# Patient Record
Sex: MALE | Race: WHITE | Employment: UNEMPLOYED | URBAN - METROPOLITAN AREA
[De-identification: names, ages, dates, MRNs, and addresses within clinical notes are randomized per-mention and may not be internally consistent; named-entity substitution may affect disease eponyms.]

---

## 2018-05-31 ENCOUNTER — HOSPITAL ENCOUNTER (EMERGENCY)
Age: 58
Discharge: HOME | End: 2018-05-31
Attending: EMERGENCY MEDICINE
Payer: COMMERCIAL

## 2018-05-31 VITALS
WEIGHT: 160 LBS | RESPIRATION RATE: 17 BRPM | OXYGEN SATURATION: 99 % | SYSTOLIC BLOOD PRESSURE: 167 MMHG | TEMPERATURE: 97.9 F | HEART RATE: 67 BPM | DIASTOLIC BLOOD PRESSURE: 97 MMHG | BODY MASS INDEX: 21.67 KG/M2 | HEIGHT: 72 IN

## 2018-05-31 DIAGNOSIS — M54.42 CHRONIC LEFT-SIDED LOW BACK PAIN WITH LEFT-SIDED SCIATICA: Primary | ICD-10-CM

## 2018-05-31 DIAGNOSIS — G89.29 CHRONIC LEFT-SIDED LOW BACK PAIN WITH LEFT-SIDED SCIATICA: Primary | ICD-10-CM

## 2018-05-31 LAB
ATRIAL RATE: 227 BPM
CALCULATED R AXIS, ECG10: 52 DEGREES
CALCULATED T AXIS, ECG11: -42 DEGREES
DIAGNOSIS, 93000: NORMAL
Q-T INTERVAL, ECG07: 392 MS
QRS DURATION, ECG06: 94 MS
QTC CALCULATION (BEZET), ECG08: 407 MS
VENTRICULAR RATE, ECG03: 65 BPM

## 2018-05-31 PROCEDURE — 99284 EMERGENCY DEPT VISIT MOD MDM: CPT | Performed by: EMERGENCY MEDICINE

## 2018-05-31 PROCEDURE — 93005 ELECTROCARDIOGRAM TRACING: CPT | Performed by: EMERGENCY MEDICINE

## 2018-05-31 RX ORDER — CYCLOBENZAPRINE HCL 5 MG
10 TABLET ORAL
Qty: 20 TAB | Refills: 0 | Status: SHIPPED | OUTPATIENT
Start: 2018-05-31 | End: 2018-06-08

## 2018-05-31 RX ORDER — HYDROCODONE BITARTRATE AND ACETAMINOPHEN 7.5; 325 MG/1; MG/1
1 TABLET ORAL
Qty: 15 TAB | Refills: 0 | Status: SHIPPED | OUTPATIENT
Start: 2018-05-31 | End: 2018-06-08

## 2018-05-31 NOTE — ED PROVIDER NOTES
HPI Comments: Patient with chronic back pain. Was seen by Dr. Bina Walton many years ago but was discharged from treatment years ago. Has continued to have back pain slightly worse over the past couple days. No saddle anesthesia change in bowel or bladder function. No reinjury. It is trying to get back into pain management. Patient is a 61 y.o. male presenting with back pain. The history is provided by the patient. No  was used. Back Pain    This is a chronic problem. The current episode started more than 2 days ago. The problem has been gradually worsening. The problem occurs constantly. Patient reports not work related injury. The pain is associated with a remote injury. The pain is present in the lumbar spine and right side. The quality of the pain is described as aching. The pain radiates to the right thigh. The pain is moderate. The symptoms are aggravated by bending and twisting. Pertinent negatives include no chest pain, no fever, no numbness, no headaches, no abdominal pain, no abdominal swelling, no bowel incontinence, no perianal numbness, no bladder incontinence, no dysuria, no paresthesias and no weakness. Past Medical History:   Diagnosis Date    Other ill-defined conditions(669.87)     back problems       Past Surgical History:   Procedure Laterality Date    HX APPENDECTOMY           No family history on file. Social History     Social History    Marital status:      Spouse name: N/A    Number of children: N/A    Years of education: N/A     Occupational History    Not on file. Social History Main Topics    Smoking status: Current Every Day Smoker     Packs/day: 0.50    Smokeless tobacco: Not on file    Alcohol use No    Drug use: No    Sexual activity: Not Currently     Other Topics Concern    Not on file     Social History Narrative         ALLERGIES: Review of patient's allergies indicates no known allergies.     Review of Systems Constitutional: Negative for chills and fever. Eyes: Negative for pain and redness. Respiratory: Negative for chest tightness, shortness of breath and wheezing. Cardiovascular: Negative for chest pain and leg swelling. Gastrointestinal: Negative for abdominal pain, bowel incontinence, diarrhea, nausea and vomiting. Genitourinary: Negative for bladder incontinence, dysuria and hematuria. Musculoskeletal: Positive for back pain. Negative for gait problem, neck pain and neck stiffness. Skin: Negative for color change and rash. Neurological: Negative for weakness, numbness, headaches and paresthesias. Vitals:    05/31/18 0919   BP: (!) 181/114   Pulse: 92   Resp: 18   Temp: 97.9 °F (36.6 °C)   SpO2: 98%   Weight: 72.6 kg (160 lb)   Height: 6' (1.829 m)            Physical Exam   Constitutional: He is oriented to person, place, and time. He appears well-developed and well-nourished. HENT:   Head: Normocephalic and atraumatic. Neck: Normal range of motion. Neck supple. Cardiovascular: Normal rate and regular rhythm. No murmur heard. Pulmonary/Chest: Effort normal and breath sounds normal. He has no wheezes. Abdominal: Soft. Bowel sounds are normal. There is no tenderness. Musculoskeletal: Normal range of motion. He exhibits tenderness (left lower back. no midline TTP. ). He exhibits no edema. Neurological: He is alert and oriented to person, place, and time. He exhibits normal muscle tone. Skin: Skin is warm and dry. Nursing note and vitals reviewed. MDM  Number of Diagnoses or Management Options  Diagnosis management comments: Chronic back pain will treat at home and referred to primary care doctor with them referring to pain management.     Patient Progress  Patient progress: stable        ED Course       Procedures

## 2018-05-31 NOTE — ED NOTES
Patient reports that he was trying to get into pain management and that his BP was \"too high to receive treatment. \" States that he is getting \"loratab off the street. \" States that he has tried everything but \"nothing helps. \" Reports that he has not tried physical therapy or chiropractor.

## 2018-05-31 NOTE — ED NOTES
I have reviewed discharge instructions with the patient. The patient verbalized understanding. Patient left ED via Discharge Method: ambulatory to Home with self. Opportunity for questions and clarification provided. Patient given 2 scripts. No e-sign. To continue your aftercare when you leave the hospital, you may receive an automated call from our care team to check in on how you are doing. This is a free service and part of our promise to provide the best care and service to meet your aftercare needs.  If you have questions, or wish to unsubscribe from this service please call [phone]. Thank you for Choosing our [facility] Emergency Department.

## 2018-05-31 NOTE — DISCHARGE INSTRUCTIONS
Back Pain: Care Instructions  Your Care Instructions    Back pain has many possible causes. It is often related to problems with muscles and ligaments of the back. It may also be related to problems with the nerves, discs, or bones of the back. Moving, lifting, standing, sitting, or sleeping in an awkward way can strain the back. Sometimes you don't notice the injury until later. Arthritis is another common cause of back pain. Although it may hurt a lot, back pain usually improves on its own within several weeks. Most people recover in 12 weeks or less. Using good home treatment and being careful not to stress your back can help you feel better sooner. Follow-up care is a key part of your treatment and safety. Be sure to make and go to all appointments, and call your doctor if you are having problems. It's also a good idea to know your test results and keep a list of the medicines you take. How can you care for yourself at home? · Sit or lie in positions that are most comfortable and reduce your pain. Try one of these positions when you lie down:  ¨ Lie on your back with your knees bent and supported by large pillows. ¨ Lie on the floor with your legs on the seat of a sofa or chair. Merlin Antu on your side with your knees and hips bent and a pillow between your legs. ¨ Lie on your stomach if it does not make pain worse. · Do not sit up in bed, and avoid soft couches and twisted positions. Bed rest can help relieve pain at first, but it delays healing. Avoid bed rest after the first day of back pain. · Change positions every 30 minutes. If you must sit for long periods of time, take breaks from sitting. Get up and walk around, or lie in a comfortable position. · Try using a heating pad on a low or medium setting for 15 to 20 minutes every 2 or 3 hours. Try a warm shower in place of one session with the heating pad. · You can also try an ice pack for 10 to 15 minutes every 2 to 3 hours.  Put a thin cloth between the ice pack and your skin. · Take pain medicines exactly as directed. ¨ If the doctor gave you a prescription medicine for pain, take it as prescribed. ¨ If you are not taking a prescription pain medicine, ask your doctor if you can take an over-the-counter medicine. · Take short walks several times a day. You can start with 5 to 10 minutes, 3 or 4 times a day, and work up to longer walks. Walk on level surfaces and avoid hills and stairs until your back is better. · Return to work and other activities as soon as you can. Continued rest without activity is usually not good for your back. · To prevent future back pain, do exercises to stretch and strengthen your back and stomach. Learn how to use good posture, safe lifting techniques, and proper body mechanics. When should you call for help? Call your doctor now or seek immediate medical care if:  ? · You have new or worsening numbness in your legs. ? · You have new or worsening weakness in your legs. (This could make it hard to stand up.)   ? · You lose control of your bladder or bowels. ? Watch closely for changes in your health, and be sure to contact your doctor if:  ? · Your pain gets worse. ? · You are not getting better after 2 weeks. Where can you learn more? Go to http://leila-mita.info/. Enter W607 in the search box to learn more about \"Back Pain: Care Instructions. \"  Current as of: March 21, 2017  Content Version: 11.4  © 5159-2269 [employer]. Care instructions adapted under license by [employer] (which disclaims liability or warranty for this information). If you have questions about a medical condition or this instruction, always ask your healthcare professional. [place] [address] any warranty or liability for your use of this information. Learning About Relief for Back Pain  What is back tension and strain?     Back strain happens when you overstretch, or pull, a muscle in your back. You may hurt your back in an accident or when you exercise or lift something. Most back pain will get better with rest and time. You can take care of yourself at home to help your back heal.  What can you do first to relieve back pain? When you first feel back pain, try these steps:  · Walk. Take a short walk (10 to 20 minutes) on a level surface (no slopes, hills, or stairs) every 2 to 3 hours. Walk only distances you can manage without pain, especially leg pain. · Relax. Find a comfortable position for rest. Some people are comfortable on the floor or a medium-firm bed with a small pillow under their head and another under their knees. Some people prefer to lie on their side with a pillow between their knees. Don't stay in one position for too long. · Try heat or ice. Try using a heating pad on a low or medium setting, or take a warm shower, for 15 to 20 minutes every 2 to 3 hours. Or you can buy single-use heat wraps that last up to 8 hours. You can also try an ice pack for 10 to 15 minutes every 2 to 3 hours. You can use an ice pack or a bag of frozen vegetables wrapped in a thin towel. There is not strong evidence that either heat or ice will help, but you can try them to see if they help. You may also want to try switching between heat and cold. · Take pain medicine exactly as directed. ¨ If the doctor gave you a prescription medicine for pain, take it as prescribed. ¨ If you are not taking a prescription pain medicine, ask your doctor if you can take an over-the-counter medicine. What else can you do? · Stretch and exercise. Exercises that increase flexibility may relieve your pain and make it easier for your muscles to keep your spine in a good, neutral position. And don't forget to keep walking. · Do self-massage. You can use self-massage to unwind after work or school or to energize yourself in the morning. You can easily massage your feet, hands, or neck. Self-massage works best if you are in comfortable clothes and are sitting or lying in a comfortable position. Use oil or lotion to massage bare skin. · Reduce stress. Back pain can lead to a vicious [place]: Distress about the pain tenses the muscles in your back, which in turn causes more pain. Learn how to relax your mind and your muscles to lower your stress. Where can you learn more? Go to http://leila-mita.info/. Enter Y414 in the search box to learn more about \"Learning About Relief for Back Pain. \"  Current as of: March 21, 2017  Content Version: 11.4  © 2528-9602 [employer]. Care instructions adapted under license by [employer] (which disclaims liability or warranty for this information). If you have questions about a medical condition or this instruction, always ask your healthcare professional. [address] any warranty or liability for your use of this information.

## 2018-06-08 ENCOUNTER — HOSPITAL ENCOUNTER (EMERGENCY)
Age: 58
Discharge: HOME | End: 2018-06-08
Attending: EMERGENCY MEDICINE
Payer: COMMERCIAL

## 2018-06-08 VITALS
TEMPERATURE: 98.7 F | RESPIRATION RATE: 18 BRPM | HEART RATE: 85 BPM | SYSTOLIC BLOOD PRESSURE: 166 MMHG | DIASTOLIC BLOOD PRESSURE: 101 MMHG | BODY MASS INDEX: 21.4 KG/M2 | HEIGHT: 72 IN | OXYGEN SATURATION: 98 % | WEIGHT: 158 LBS

## 2018-06-08 DIAGNOSIS — M54.32 SCIATICA OF LEFT SIDE: Primary | ICD-10-CM

## 2018-06-08 PROCEDURE — 99282 EMERGENCY DEPT VISIT SF MDM: CPT | Performed by: PHYSICIAN ASSISTANT

## 2018-06-08 RX ORDER — TRAMADOL HYDROCHLORIDE 50 MG/1
50 TABLET ORAL
Qty: 11 TAB | Refills: 0 | Status: SHIPPED | OUTPATIENT
Start: 2018-06-08 | End: 2018-10-08

## 2018-06-08 RX ORDER — METHOCARBAMOL 750 MG/1
750 TABLET, FILM COATED ORAL 4 TIMES DAILY
Qty: 30 TAB | Refills: 0 | Status: SHIPPED | OUTPATIENT
Start: 2018-06-08 | End: 2018-06-16

## 2018-06-08 NOTE — DISCHARGE INSTRUCTIONS

## 2018-06-08 NOTE — ED PROVIDER NOTES
HPI Comments: Pt returns to er for pan meds, states will have appt hopefully in 1 week at [facility],     Patient is a 61 y.o. male presenting with back pain. The history is provided by the patient. Back Pain    This is a chronic problem. Episode onset: years  The problem occurs constantly. Patient reports not work related injury. The pain is associated with no known injury. The pain is present in the lumbar spine. The quality of the pain is described as aching and similar to previous episodes. The pain radiates to the left thigh. The pain is at a severity of 7/10. The pain is mild. The symptoms are aggravated by twisting. Associated symptoms include leg pain. He has tried analgesics for the symptoms. The treatment provided moderate relief. Risk factors include a sedentary lifestyle. Past Medical History:   Diagnosis Date    Other ill-defined conditions(069.89)     back problems       Past Surgical History:   Procedure Laterality Date    HX APPENDECTOMY           History reviewed. No pertinent family history. Social History     Social History    Marital status:      Spouse name: N/A    Number of children: N/A    Years of education: N/A     Occupational History    Not on file. Social History Main Topics    Smoking status: Current Every Day Smoker     Packs/day: 0.50    Smokeless tobacco: Not on file    Alcohol use No    Drug use: No    Sexual activity: Not Currently     Other Topics Concern    Not on file     Social History Narrative         ALLERGIES: Review of patient's allergies indicates no known allergies. Review of Systems   Musculoskeletal: Positive for back pain. All other systems reviewed and are negative. Vitals:    06/08/18 1302   BP: (!) 166/101   Pulse: 85   Resp: 18   Temp: 98.7 °F (37.1 °C)   SpO2: 98%   Weight: 71.7 kg (158 lb)   Height: 6' (1.829 m)            Physical Exam   Constitutional: He is oriented to person, place, and time.  He appears well-developed and well-nourished. No distress. HENT:   Head: Normocephalic and atraumatic. Eyes: Conjunctivae and EOM are normal. Pupils are equal, round, and reactive to light. Neck: Normal range of motion. Neck supple. Cardiovascular: Normal rate and regular rhythm. Pulmonary/Chest: Effort normal and breath sounds normal. No respiratory distress. He has no wheezes. Abdominal: Soft. Bowel sounds are normal.   Musculoskeletal: He exhibits tenderness. He exhibits no edema. Pain to left lower back into left hip and thigh no skin chages, no numbness to foot    Neurological: He is alert and oriented to person, place, and time. Skin: Skin is warm. Nursing note and vitals reviewed.        MDM  Number of Diagnoses or Management Options  Diagnosis management comments: Sciatica will give ultram and robaxin        Amount and/or Complexity of Data Reviewed  Review and summarize past medical records: yes    Risk of Complications, Morbidity, and/or Mortality  Presenting problems: low  Diagnostic procedures: low  Management options: low    Patient Progress  Patient progress: improved        ED Course       Procedures

## 2018-06-08 NOTE — ED NOTES
I have reviewed discharge instructions with the patient. The patient verbalized understanding. Patient left ED via Discharge Method: ambulatory to Home with self. Opportunity for questions and clarification provided. Patient given 2 scripts. To continue your aftercare when you leave the hospital, you may receive an automated call from our care team to check in on how you are doing. This is a free service and part of our promise to provide the best care and service to meet your aftercare needs.  If you have questions, or wish to unsubscribe from this service please call [phone]. Thank you for Choosing our [facility] Emergency Department.

## 2018-06-08 NOTE — ED TRIAGE NOTES
C/o lower back pain. Onset approx 6-7 years ago with worsening over last week. States pain radiates down left leg, states causes my \"leg to go out\". Admits to falling approx 1 week ago with possible worsening at that time. Scheduled for pain management approx next Friday. States out of pain meds x3 days.  Denies urinary symptoms

## 2018-07-23 ENCOUNTER — HOSPITAL ENCOUNTER (EMERGENCY)
Age: 58
Discharge: HOME | End: 2018-07-23
Attending: EMERGENCY MEDICINE
Payer: COMMERCIAL

## 2018-07-23 VITALS
HEART RATE: 68 BPM | HEIGHT: 72 IN | WEIGHT: 158 LBS | DIASTOLIC BLOOD PRESSURE: 97 MMHG | RESPIRATION RATE: 20 BRPM | OXYGEN SATURATION: 99 % | SYSTOLIC BLOOD PRESSURE: 186 MMHG | BODY MASS INDEX: 21.4 KG/M2 | TEMPERATURE: 98.3 F

## 2018-07-23 PROCEDURE — 75810000275 HC EMERGENCY DEPT VISIT NO LEVEL OF CARE: Performed by: EMERGENCY MEDICINE

## 2018-08-17 ENCOUNTER — HOSPITAL ENCOUNTER (EMERGENCY)
Age: 58
Discharge: HOME | End: 2018-08-17
Attending: EMERGENCY MEDICINE
Payer: COMMERCIAL

## 2018-08-17 VITALS
WEIGHT: 160 LBS | RESPIRATION RATE: 18 BRPM | TEMPERATURE: 98.2 F | BODY MASS INDEX: 21.67 KG/M2 | OXYGEN SATURATION: 98 % | HEIGHT: 72 IN | HEART RATE: 94 BPM | DIASTOLIC BLOOD PRESSURE: 80 MMHG | SYSTOLIC BLOOD PRESSURE: 171 MMHG

## 2018-08-17 DIAGNOSIS — M54.50 CHRONIC LOW BACK PAIN WITHOUT SCIATICA, UNSPECIFIED BACK PAIN LATERALITY: Primary | ICD-10-CM

## 2018-08-17 DIAGNOSIS — G89.29 CHRONIC LOW BACK PAIN WITHOUT SCIATICA, UNSPECIFIED BACK PAIN LATERALITY: Primary | ICD-10-CM

## 2018-08-17 PROCEDURE — 99282 EMERGENCY DEPT VISIT SF MDM: CPT | Performed by: NURSE PRACTITIONER

## 2018-08-17 PROCEDURE — 96372 THER/PROPH/DIAG INJ SC/IM: CPT | Performed by: NURSE PRACTITIONER

## 2018-08-17 PROCEDURE — 74011250636 HC RX REV CODE- 250/636: Performed by: NURSE PRACTITIONER

## 2018-08-17 RX ORDER — METAXALONE 800 MG/1
800 TABLET ORAL 4 TIMES DAILY
Qty: 20 TAB | Refills: 0 | Status: SHIPPED | OUTPATIENT
Start: 2018-08-17 | End: 2018-08-22

## 2018-08-17 RX ORDER — LIDOCAINE 4 G/100G
PATCH TOPICAL
Qty: 10 PATCH | Refills: 0 | Status: SHIPPED | OUTPATIENT
Start: 2018-08-17

## 2018-08-17 RX ORDER — DEXAMETHASONE SODIUM PHOSPHATE 100 MG/10ML
10 INJECTION INTRAMUSCULAR; INTRAVENOUS
Status: COMPLETED | OUTPATIENT
Start: 2018-08-17 | End: 2018-08-17

## 2018-08-17 RX ORDER — KETOROLAC TROMETHAMINE 30 MG/ML
30 INJECTION, SOLUTION INTRAMUSCULAR; INTRAVENOUS
Status: COMPLETED | OUTPATIENT
Start: 2018-08-17 | End: 2018-08-17

## 2018-08-17 RX ORDER — PREDNISONE 20 MG/1
TABLET ORAL
Qty: 11 TAB | Refills: 0 | Status: SHIPPED | OUTPATIENT
Start: 2018-08-17 | End: 2018-10-08

## 2018-08-17 RX ADMIN — KETOROLAC TROMETHAMINE 30 MG: 30 INJECTION, SOLUTION INTRAMUSCULAR at 12:53

## 2018-08-17 RX ADMIN — DEXAMETHASONE SODIUM PHOSPHATE 10 MG: 10 INJECTION INTRAMUSCULAR; INTRAVENOUS at 12:52

## 2018-08-17 NOTE — ED TRIAGE NOTES
Pt reports hx of herniated disk in back, appt with pain management this week but they didn't take his insurance. Pt states he needs something for the pain.

## 2018-08-17 NOTE — ED NOTES
I have reviewed discharge instructions with the patient. The patient verbalized understanding. Patient left ED via Discharge Method: ambulatory to Home with (SELF). Opportunity for questions and clarification provided. Patient given 3 scripts. To continue your aftercare when you leave the hospital, you may receive an automated call from our care team to check in on how you are doing. This is a free service and part of our promise to provide the best care and service to meet your aftercare needs.  If you have questions, or wish to unsubscribe from this service please call [phone]. Thank you for Choosing our [facility] Emergency Department.

## 2018-08-17 NOTE — ED PROVIDER NOTES
HPI Comments: 58-year-old male presents for evaluation of low back pain. He was scheduled through [facility] to see pain management this week. He waited for his appointment he went for his appointment and on arrival found out that no they did not take his insurance. He went back to [facility] to be seen but they could not see him. They will see him in 72 hours. He presents today for evaluation and treatment of his low back pain. He reports that he has chronic low back pain and he has old MRI results with him he has pain to the left lower back,, worse with bending movement walking. No difficulty with bowel or bladder. Appears quite uncomfortable. No fever chills nausea vomiting diarrhea cough or congestion. He is ambulatory, with a slow but steady gait    Patient is a 60 y.o. male presenting with back pain. The history is provided by the patient. No  was used. Back Pain    This is a recurrent problem. The problem has not changed since onset. The problem occurs constantly. The pain is associated with a remote injury. The pain is present in the lower back and left side. The quality of the pain is described as aching. The pain is moderate. Pertinent negatives include no chest pain, no fever, no numbness, no weight loss, no headaches, no abdominal pain, no abdominal swelling, no bowel incontinence, no perianal numbness, no bladder incontinence, no dysuria, no pelvic pain, no leg pain, no paresthesias, no paresis, no tingling and no weakness. Past Medical History:   Diagnosis Date    Other ill-defined conditions(369.89)     back problems       Past Surgical History:   Procedure Laterality Date    HX APPENDECTOMY           History reviewed. No pertinent family history. Social History     Social History    Marital status:      Spouse name: N/A    Number of children: N/A    Years of education: N/A     Occupational History    Not on file.      Social History Main Topics  Smoking status: Current Every Day Smoker     Packs/day: 0.50    Smokeless tobacco: Not on file    Alcohol use No    Drug use: No    Sexual activity: Not Currently     Other Topics Concern    Not on file     Social History Narrative         ALLERGIES: Review of patient's allergies indicates no known allergies. Review of Systems   Constitutional: Negative for chills, fever and weight loss. HENT: Negative for facial swelling and mouth sores. Eyes: Negative for discharge and redness. Respiratory: Negative for cough and shortness of breath. Cardiovascular: Negative for chest pain and palpitations. Gastrointestinal: Negative for abdominal pain, bowel incontinence, nausea and vomiting. Genitourinary: Negative for bladder incontinence, difficulty urinating, dysuria and pelvic pain. Musculoskeletal: Positive for back pain and myalgias. Skin: Negative for pallor and rash. Neurological: Negative for tingling, weakness, numbness, headaches and paresthesias. Psychiatric/Behavioral: Negative for confusion and decreased concentration. Vitals:    08/17/18 1127   BP: 171/80   Pulse: 94   Resp: 18   Temp: 98.2 °F (36.8 °C)   SpO2: 98%   Weight: 72.6 kg (160 lb)   Height: 6' (1.829 m)            Physical Exam   Constitutional: He is oriented to person, place, and time. He appears well-developed and well-nourished. HENT:   Head: Normocephalic and atraumatic. Eyes: EOM are normal. Pupils are equal, round, and reactive to light. Neck: Normal range of motion. Cardiovascular: Normal rate and regular rhythm. Pulmonary/Chest: Effort normal and breath sounds normal.   Abdominal: Soft. Musculoskeletal: Normal range of motion. He exhibits tenderness. Lumbar back: He exhibits tenderness. Back:    Neurological: He is alert and oriented to person, place, and time. Skin: Skin is warm and dry. Psychiatric: He has a normal mood and affect.  His behavior is normal. Thought content normal.   Nursing note and vitals reviewed. MDM  Number of Diagnoses or Management Options  Diagnosis management comments: 18-year-old male presents for evaluation of low back pain. He was scheduled through [facility] to see pain management this week. He waited for his appointment he went for his appointment and on arrival found out that no they did not take his insurance. He went back to [facility] to be seen but they could not see him. They will see him in 72 hours. He presents today for evaluation and treatment of his low back pain. He reports that he has chronic low back pain and he has old MRI results with him he has pain to the left lower back,, worse with bending movement walking. No difficulty with bowel or bladder. Appears quite uncomfortable. No fever chills nausea vomiting diarrhea cough or congestion. He is ambulatory, with a slow but steady gait    On exam pt with pain as noted.   Will provide pain control in ed, dc home with steroids and non narcotic pain control   Copy of mri to chart    Risk of Complications, Morbidity, and/or Mortality  Presenting problems: minimal  Diagnostic procedures: minimal  Management options: low    Patient Progress  Patient progress: stable        ED Course       Procedures

## 2018-08-17 NOTE — DISCHARGE INSTRUCTIONS
Learning About How to Have a Healthy Back  What causes back pain? Back pain is often caused by overuse, strain, or injury. For example, people often hurt their backs playing sports or working in the yard, being jolted in a car accident, or lifting something too heavy. Aging plays a part too. Your bones and muscles tend to lose strength as you age, which makes injury more likely. The spongy discs between the bones of the spine (vertebrae) may suffer from wear and tear and no longer provide enough cushion between the bones. A disc that bulges or breaks open (herniated disc) can press on nerves, causing back pain. In some people, back pain is the result of arthritis, broken vertebrae caused by bone loss (osteoporosis), illness, or a spine problem. Although most people have back pain at one time or another, there are steps you can take to make it less likely. How can you have a healthy back? Reduce stress on your back through good posture  Slumping or slouching alone may not cause low back pain. But after the back has been strained or injured, bad posture can make pain worse. · Sleep in a position that maintains your back's normal curves and on a mattress that feels comfortable. Sleep on your side with a pillow between your knees, or sleep on your back with a pillow under your knees. These positions can reduce strain on your back. · Stand and sit up straight. \"Good posture\" generally means your ears, shoulders, and hips are in a straight line. · If you must stand for a long time, put one foot on a stool, ledge, or box. Switch feet every now and then. · Sit in a chair that is low enough to let you place both feet flat on the floor with both knees nearly level with your hips. If your chair or desk is too high, use a footrest to raise your knees. Place a small pillow, a rolled-up towel, or a lumbar roll in the curve of your back if you need extra support.   · Try a kneeling chair, which helps tilt your hips forward. This takes pressure off your lower back. · Try sitting on an exercise ball. It can rock from side to side, which helps keep your back loose. · When driving, keep your knees nearly level with your hips. Sit straight, and drive with both hands on the steering wheel. Your arms should be in a slightly bent position. Reduce stress on your back through careful lifting  · Squat down, bending at the hips and knees only. If you need to, put one knee to the floor and extend your other knee in front of you, bent at a right angle (half kneeling). · Press your chest straight forward. This helps keep your upper back straight while keeping a slight arch in your low back. · Hold the load as close to your body as possible, at the level of your belly button (navel). · Use your feet to change direction, taking small steps. · Lead with your hips as you change direction. Keep your shoulders in line with your hips as you move. · Set down your load carefully, squatting with your knees and hips only. Exercise and stretch your back  · Do some exercise on most days of the week, if your doctor says it is okay. You can walk, run, swim, or cycle. · Stretch your back muscles. Here are a few exercises to try:  Josephus Phlegm on your back, and gently pull one bent knee to your chest. Put that foot back on the floor, and then pull the other knee to your chest.  ¨ Do pelvic tilts. Lie on your back with your knees bent. Tighten your stomach muscles. Pull your belly button (navel) in and up toward your ribs. You should feel like your back is pressing to the floor and your hips and pelvis are slightly lifting off the floor. Hold for 6 seconds while breathing smoothly. ¨ Sit with your back flat against a wall. · Keep your core muscles strong. The muscles of your back, belly (abdomen), and buttocks support your spine. ¨ Pull in your belly and imagine pulling your navel toward your spine. Hold this for 6 seconds, then relax.  Remember to keep breathing normally as you tense your muscles. ¨ Do curl-ups. Always do them with your knees bent. Keep your low back on the floor, and curl your shoulders toward your knees using a smooth, slow motion. Keep your arms folded across your chest. If this bothers your neck, try putting your hands behind your neck (not your head), with your elbows spread apart. ¨ Lie on your back with your knees bent and your feet flat on the floor. Tighten your belly muscles, and then push with your feet and raise your buttocks up a few inches. Hold this position 6 seconds as you continue to breathe normally, then lower yourself slowly to the floor. Repeat 8 to 12 times. ¨ If you like group exercise, try Pilates or yoga. These classes have poses that strengthen the core muscles. Lead a healthy lifestyle  · Stay at a healthy weight to avoid strain on your back. · Do not smoke. Smoking increases the risk of osteoporosis, which weakens the spine. If you need help quitting, talk to your doctor about stop-smoking programs and medicines. These can increase your chances of quitting for good. Where can you learn more? Go to http://leila-mita.info/. Enter L315 in the search box to learn more about \"Learning About How to Have a Healthy Back. \"  Current as of: November 29, 2017  Content Version: 11.7  © 3244-0888 [employer]. Care instructions adapted under license by [employer] (which disclaims liability or warranty for this information). If you have questions about a medical condition or this instruction, always ask your healthcare professional. [place] [address] any warranty or liability for your use of this information.

## 2019-03-16 ENCOUNTER — APPOINTMENT (OUTPATIENT)
Dept: CT IMAGING | Age: 59
DRG: 070 | End: 2019-03-16
Attending: EMERGENCY MEDICINE
Payer: COMMERCIAL

## 2019-03-16 ENCOUNTER — HOSPITAL ENCOUNTER (INPATIENT)
Age: 59
LOS: 3 days | Discharge: HOME | DRG: 070 | End: 2019-03-20
Attending: EMERGENCY MEDICINE | Admitting: HOSPITALIST
Payer: COMMERCIAL

## 2019-03-16 DIAGNOSIS — I10 HYPERTENSION, UNSPECIFIED TYPE: ICD-10-CM

## 2019-03-16 DIAGNOSIS — G93.40 ENCEPHALOPATHY ACUTE: Primary | ICD-10-CM

## 2019-03-16 LAB
ALBUMIN SERPL-MCNC: 3.5 G/DL (ref 3.5–5)
ALBUMIN/GLOB SERPL: 0.9 {RATIO} (ref 1.2–3.5)
ALP SERPL-CCNC: 75 U/L (ref 50–136)
ALT SERPL-CCNC: 139 U/L (ref 12–65)
AMPHET UR QL SCN: NEGATIVE
ANION GAP SERPL CALC-SCNC: 6 MMOL/L (ref 7–16)
APAP SERPL-MCNC: <2 UG/ML (ref 10–30)
AST SERPL-CCNC: 107 U/L (ref 15–37)
BARBITURATES UR QL SCN: NEGATIVE
BASOPHILS # BLD: 0 K/UL (ref 0–0.2)
BASOPHILS NFR BLD: 0 % (ref 0–2)
BENZODIAZ UR QL: NEGATIVE
BILIRUB SERPL-MCNC: 0.2 MG/DL (ref 0.2–1.1)
BUN SERPL-MCNC: 22 MG/DL (ref 6–23)
CALCIUM SERPL-MCNC: 8.2 MG/DL (ref 8.3–10.4)
CANNABINOIDS UR QL SCN: NEGATIVE
CHLORIDE SERPL-SCNC: 106 MMOL/L (ref 98–107)
CK SERPL-CCNC: 247 U/L (ref 21–215)
CO2 SERPL-SCNC: 31 MMOL/L (ref 21–32)
COCAINE UR QL SCN: NEGATIVE
CREAT SERPL-MCNC: 0.97 MG/DL (ref 0.8–1.5)
DIFFERENTIAL METHOD BLD: ABNORMAL
EOSINOPHIL # BLD: 0.2 K/UL (ref 0–0.8)
EOSINOPHIL NFR BLD: 2 % (ref 0.5–7.8)
ERYTHROCYTE [DISTWIDTH] IN BLOOD BY AUTOMATED COUNT: 13.7 % (ref 11.9–14.6)
ETHANOL SERPL-MCNC: <3 MG/DL
GLOBULIN SER CALC-MCNC: 4.1 G/DL (ref 2.3–3.5)
GLUCOSE BLD STRIP.AUTO-MCNC: 140 MG/DL (ref 65–100)
GLUCOSE SERPL-MCNC: 60 MG/DL (ref 65–100)
HCT VFR BLD AUTO: 40.5 % (ref 41.1–50.3)
HGB BLD-MCNC: 13.1 G/DL (ref 13.6–17.2)
IMM GRANULOCYTES # BLD AUTO: 0.1 K/UL (ref 0–0.5)
IMM GRANULOCYTES NFR BLD AUTO: 0 % (ref 0–5)
LYMPHOCYTES # BLD: 3.9 K/UL (ref 0.5–4.6)
LYMPHOCYTES NFR BLD: 29 % (ref 13–44)
MCH RBC QN AUTO: 29.6 PG (ref 26.1–32.9)
MCHC RBC AUTO-ENTMCNC: 32.3 G/DL (ref 31.4–35)
MCV RBC AUTO: 91.4 FL (ref 79.6–97.8)
METHADONE UR QL: POSITIVE
MONOCYTES # BLD: 1.3 K/UL (ref 0.1–1.3)
MONOCYTES NFR BLD: 10 % (ref 4–12)
NEUTS SEG # BLD: 7.7 K/UL (ref 1.7–8.2)
NEUTS SEG NFR BLD: 59 % (ref 43–78)
NRBC # BLD: 0 K/UL (ref 0–0.2)
OPIATES UR QL: POSITIVE
PCP UR QL: NEGATIVE
PLATELET # BLD AUTO: 282 K/UL (ref 150–450)
PMV BLD AUTO: 9 FL (ref 9.4–12.3)
POTASSIUM SERPL-SCNC: 3.2 MMOL/L (ref 3.5–5.1)
PROT SERPL-MCNC: 7.6 G/DL (ref 6.3–8.2)
RBC # BLD AUTO: 4.43 M/UL (ref 4.23–5.6)
SALICYLATES SERPL-MCNC: 17.3 MG/DL (ref 2.8–20)
SODIUM SERPL-SCNC: 143 MMOL/L (ref 136–145)
WBC # BLD AUTO: 13.2 K/UL (ref 4.3–11.1)

## 2019-03-16 PROCEDURE — 74011250636 HC RX REV CODE- 250/636: Performed by: EMERGENCY MEDICINE

## 2019-03-16 PROCEDURE — 96372 THER/PROPH/DIAG INJ SC/IM: CPT | Performed by: EMERGENCY MEDICINE

## 2019-03-16 PROCEDURE — 96361 HYDRATE IV INFUSION ADD-ON: CPT | Performed by: EMERGENCY MEDICINE

## 2019-03-16 PROCEDURE — 96374 THER/PROPH/DIAG INJ IV PUSH: CPT | Performed by: EMERGENCY MEDICINE

## 2019-03-16 PROCEDURE — 70450 CT HEAD/BRAIN W/O DYE: CPT

## 2019-03-16 PROCEDURE — 85025 COMPLETE CBC W/AUTO DIFF WBC: CPT

## 2019-03-16 PROCEDURE — 80307 DRUG TEST PRSMV CHEM ANLYZR: CPT

## 2019-03-16 PROCEDURE — 82550 ASSAY OF CK (CPK): CPT

## 2019-03-16 PROCEDURE — 93005 ELECTROCARDIOGRAM TRACING: CPT | Performed by: EMERGENCY MEDICINE

## 2019-03-16 PROCEDURE — 96376 TX/PRO/DX INJ SAME DRUG ADON: CPT | Performed by: EMERGENCY MEDICINE

## 2019-03-16 PROCEDURE — 80053 COMPREHEN METABOLIC PANEL: CPT

## 2019-03-16 PROCEDURE — 74011000250 HC RX REV CODE- 250: Performed by: EMERGENCY MEDICINE

## 2019-03-16 PROCEDURE — 82962 GLUCOSE BLOOD TEST: CPT

## 2019-03-16 PROCEDURE — 96375 TX/PRO/DX INJ NEW DRUG ADDON: CPT | Performed by: EMERGENCY MEDICINE

## 2019-03-16 PROCEDURE — 74011250636 HC RX REV CODE- 250/636

## 2019-03-16 PROCEDURE — 99285 EMERGENCY DEPT VISIT HI MDM: CPT | Performed by: EMERGENCY MEDICINE

## 2019-03-16 RX ORDER — SODIUM CHLORIDE 9 MG/ML
150 INJECTION, SOLUTION INTRAVENOUS CONTINUOUS
Status: DISCONTINUED | OUTPATIENT
Start: 2019-03-16 | End: 2019-03-17

## 2019-03-16 RX ORDER — KETAMINE HYDROCHLORIDE 50 MG/ML
1 INJECTION, SOLUTION INTRAMUSCULAR; INTRAVENOUS ONCE
Status: COMPLETED | OUTPATIENT
Start: 2019-03-16 | End: 2019-03-16

## 2019-03-16 RX ORDER — LORAZEPAM 2 MG/ML
2 INJECTION INTRAMUSCULAR
Status: COMPLETED | OUTPATIENT
Start: 2019-03-16 | End: 2019-03-16

## 2019-03-16 RX ORDER — ONDANSETRON 2 MG/ML
4 INJECTION INTRAMUSCULAR; INTRAVENOUS
Status: COMPLETED | OUTPATIENT
Start: 2019-03-16 | End: 2019-03-16

## 2019-03-16 RX ORDER — DEXTROSE 50 % IN WATER (D50W) INTRAVENOUS SYRINGE
50
Status: COMPLETED | OUTPATIENT
Start: 2019-03-16 | End: 2019-03-16

## 2019-03-16 RX ORDER — LORAZEPAM 2 MG/ML
1 INJECTION INTRAMUSCULAR
Status: DISCONTINUED | OUTPATIENT
Start: 2019-03-16 | End: 2019-03-16

## 2019-03-16 RX ORDER — LORAZEPAM 2 MG/ML
INJECTION INTRAMUSCULAR
Status: COMPLETED
Start: 2019-03-16 | End: 2019-03-16

## 2019-03-16 RX ORDER — MORPHINE SULFATE 4 MG/ML
8 INJECTION INTRAVENOUS
Status: COMPLETED | OUTPATIENT
Start: 2019-03-16 | End: 2019-03-16

## 2019-03-16 RX ORDER — KETAMINE HYDROCHLORIDE 50 MG/ML
0.5 INJECTION, SOLUTION INTRAMUSCULAR; INTRAVENOUS ONCE
Status: DISCONTINUED | OUTPATIENT
Start: 2019-03-16 | End: 2019-03-16

## 2019-03-16 RX ORDER — ZIPRASIDONE MESYLATE 20 MG/ML
INJECTION, POWDER, LYOPHILIZED, FOR SOLUTION INTRAMUSCULAR
Status: COMPLETED
Start: 2019-03-16 | End: 2019-03-16

## 2019-03-16 RX ADMIN — WATER 10 MG: 1 INJECTION INTRAMUSCULAR; INTRAVENOUS; SUBCUTANEOUS at 19:39

## 2019-03-16 RX ADMIN — LORAZEPAM 2 MG: 2 INJECTION INTRAMUSCULAR; INTRAVENOUS at 22:21

## 2019-03-16 RX ADMIN — DEXTROSE MONOHYDRATE 25 G: 25 INJECTION, SOLUTION INTRAVENOUS at 21:48

## 2019-03-16 RX ADMIN — ONDANSETRON 4 MG: 2 INJECTION INTRAMUSCULAR; INTRAVENOUS at 19:16

## 2019-03-16 RX ADMIN — KETAMINE HYDROCHLORIDE 73 MG: 50 INJECTION, SOLUTION INTRAMUSCULAR; INTRAVENOUS at 23:30

## 2019-03-16 RX ADMIN — LORAZEPAM 2 MG: 2 INJECTION INTRAMUSCULAR; INTRAVENOUS at 20:43

## 2019-03-16 RX ADMIN — LORAZEPAM 2 MG: 2 INJECTION INTRAMUSCULAR; INTRAVENOUS at 19:17

## 2019-03-16 RX ADMIN — MORPHINE SULFATE 8 MG: 4 INJECTION INTRAVENOUS at 23:05

## 2019-03-16 RX ADMIN — SODIUM CHLORIDE 150 ML/HR: 900 INJECTION, SOLUTION INTRAVENOUS at 20:42

## 2019-03-16 RX ADMIN — LORAZEPAM 2 MG: 2 INJECTION INTRAMUSCULAR at 20:12

## 2019-03-16 RX ADMIN — KETAMINE HYDROCHLORIDE 73 MG: 50 INJECTION, SOLUTION INTRAMUSCULAR; INTRAVENOUS at 21:45

## 2019-03-16 RX ADMIN — LORAZEPAM 2 MG: 2 INJECTION INTRAMUSCULAR; INTRAVENOUS at 23:28

## 2019-03-16 RX ADMIN — ZIPRASIDONE MESYLATE 10 MG: 20 INJECTION, POWDER, LYOPHILIZED, FOR SOLUTION INTRAMUSCULAR at 19:56

## 2019-03-16 RX ADMIN — LORAZEPAM 2 MG: 2 INJECTION INTRAMUSCULAR at 19:17

## 2019-03-16 RX ADMIN — LORAZEPAM 2 MG: 2 INJECTION, SOLUTION INTRAMUSCULAR; INTRAVENOUS at 20:12

## 2019-03-16 NOTE — ED TRIAGE NOTES
Pt arrives per EMS. Pt called EMS for complaints of having a seizure. Initially pt refused but then came out to the ambulance wanting to come. During transport pt became combative and EMS had torestrain. Pt given 250ml of D10. Pts initally bgl 56. Refused oral glucose. BGL then 161 and no change in pts aggressive behavior.

## 2019-03-16 NOTE — ED PROVIDER NOTES
The history is provided by the patient and the EMS personnel. Aggressive behavior    This is a new problem. The current episode started 1 to 2 hours ago. The problem has been gradually improving. Associated symptoms include agitation. Mental status baseline is normal.  Risk factors include illicit drug use (patient states he \"took a white pill. \"). His past medical history does not include head trauma or heart disease. Past Medical History:   Diagnosis Date    Other ill-defined conditions(799.89)     back problems       Past Surgical History:   Procedure Laterality Date    HX APPENDECTOMY           History reviewed. No pertinent family history. Social History     Socioeconomic History    Marital status:      Spouse name: Not on file    Number of children: Not on file    Years of education: Not on file    Highest education level: Not on file   Social Needs    Financial resource strain: Not on file    Food insecurity - worry: Not on file    Food insecurity - inability: Not on file    Transportation needs - medical: Not on file   [employer] needs - non-medical: Not on file   Occupational History    Not on file   Tobacco Use    Smoking status: Current Every Day Smoker     Packs/day: 0.50    Smokeless tobacco: Never Used   Substance and Sexual Activity    Alcohol use: No    Drug use: No    Sexual activity: Not Currently   Other Topics Concern    Not on file   Social History Narrative    Not on file         ALLERGIES: Patient has no known allergies. Review of Systems   Unable to perform ROS: Mental status change   Psychiatric/Behavioral: Positive for agitation. Vitals:    03/16/19 1903   BP: (!) 185/91   Pulse: 81   Resp: 18   SpO2: 98%   Weight: 73 kg (161 lb)   Height: 5' 9\" (1.753 m)            Physical Exam   Constitutional: He is oriented to person, place, and time. He appears well-developed and well-nourished. He appears distressed.    HENT:   Head: Normocephalic and atraumatic. Mouth/Throat: Oropharynx is clear and moist. No oropharyngeal exudate. Eyes: Conjunctivae and EOM are normal. Pupils are equal, round, and reactive to light. No scleral icterus. Neck: Normal range of motion. Neck supple. No JVD present. No thyromegaly present. Cardiovascular: Normal rate, regular rhythm, normal heart sounds and intact distal pulses. Exam reveals no gallop and no friction rub. No murmur heard. Pulmonary/Chest: Effort normal and breath sounds normal. No respiratory distress. He has no wheezes. Abdominal: Soft. Bowel sounds are normal. He exhibits no distension. There is no hepatosplenomegaly. There is no tenderness. Musculoskeletal: Normal range of motion. He exhibits no edema, tenderness or deformity. Neurological: He is alert and oriented to person, place, and time. No cranial nerve deficit or sensory deficit. He exhibits normal muscle tone. Coordination normal.   Skin: Skin is warm. Capillary refill takes less than 2 seconds. No rash noted. He is diaphoretic. Psychiatric: His affect is labile and inappropriate. His speech is delayed. He is agitated and hyperactive. Cognition and memory are impaired. Nursing note and vitals reviewed. MDM  Number of Diagnoses or Management Options  Encephalopathy acute: new and requires workup  Hypertension, unspecified type: established and worsening  Diagnosis management comments: EKG reviewed  Sinus rhythm, normal axis, normal intervals, no ectopy  No acute ischemic changes    54-year-old male brought to the ER for agitation and aggressive behavior. No witnessed events reported  Possible seizure, But patient has no seizure history. I have concluded a face-to-face evaluation with this patient and I deemed that he is appropriate for self restraints  Current patient appears more agitated and postictal.    Patient's controlled substance prescription records reviewed @ the [address] [place] [address] Aware website.   Information will be utilized for accurate and appropriate patient care. 05/31/2018   2   05/31/2018   Hydrocodone-Acetamin 7.5-325   15  3  Ju Lory   [id number]  [facility] (3141)   0  37.50 MME  [place]   10/08/2017   2   10/06/2017   Oxycodone-Acetaminophen 5-325   90  30  Ju Hut   [id number]  Edc (1218)   0  22.50 MME  [place]   09/08/2017   2   09/08/2017   Oxycodone-Acetaminophen 5-325   90  30  Ju Hut   [id number]  Edc (9443)   0  22.50 MME  [place]   08/26/2017   2   08/26/2017   Hydrocodone-Acetamin 7.5-325   8  1  Ki Kyk   [id number]  Edc (1117)   0  60.00 MME  [place]   06/12/2017   2   06/12/2017   Hydrocodone-Acetamin  Mg   6  1  Ma Jose   [id number]  Edc (1117)   0  60.00 MME  [place]   06/07/2017   2   06/07/2017   Hydrocodone-Acetamin  Mg   12  3  Wi Bar   [id number]  Edc (1117)   0  40.00 MME  [place]   05/30/2017   2   05/29/2017   Hydrocodone-Acetamin 7.5-325   18  3  Viky Wor   [id number]  Edc (6283)   0  45.00 MME  [place]   05/24/2017   3   05/24/2017   Hydrocodone-Acetamin 7.5-325   18  3  Da Car   [id number]  Pub (1289)   0  45.00 MME  Comm [facility]   05/14/2017   2   05/14/2017   Hydrocodone-Acetamin  Mg   12  3  Pa Shane   [id number]  Edc (1117)   0  40.00 MME  [place]   04/27/2017   2   04/27/2017   Hydrocodone-Acetamin 7.5-325   30  5  Da Car   [id number]  Edc ([id number])   0  45.00 MME  [place]   04/19/2017   2   04/19/2017   Hydrocodone-Acetamin 5-325 Mg   16  2  P Ms   [id number]  Edc (1117)   0  40.00 MME  [place]   03/24/2017   1   03/24/2017   Hydrocodone-Acetamin  Mg   90  22  Al Jenny   [id number]  Par (8116)   0  40.91 MME  Comm       12:28 AM  Case reviewed with the hospitalist service  They will admit    After multiple doses of medication. The patient is well relaxed. He still has occasional episodes of agitation. no seizure activity.          Amount and/or Complexity of Data Reviewed  Clinical lab tests: ordered and reviewed  Tests in the radiology section of CPT®: ordered  Tests in the medicine section of CPT®: ordered and reviewed  Review and summarize past medical records: yes  Discuss the patient with other providers: yes  Independent visualization of images, tracings, or specimens: yes    Risk of Complications, Morbidity, and/or Mortality  Presenting problems: high  Diagnostic procedures: high  Management options: high  General comments: Elements of this note have been dictated via voice recognition software. Text and phrases may be limited by the accuracy of the software. The chart has been reviewed, but errors may still be present.       Critical Care  Total time providing critical care:  minutes (100)    Patient Progress  Patient progress: improved         Procedures

## 2019-03-17 ENCOUNTER — APPOINTMENT (OUTPATIENT)
Dept: GENERAL RADIOLOGY | Age: 59
DRG: 070 | End: 2019-03-17
Attending: INTERNAL MEDICINE
Payer: COMMERCIAL

## 2019-03-17 PROBLEM — F11.20 METHADONE DEPENDENCE (HCC): Status: ACTIVE | Noted: 2019-03-17

## 2019-03-17 PROBLEM — E86.0 DEHYDRATION: Status: ACTIVE | Noted: 2019-03-17

## 2019-03-17 PROBLEM — F11.10 OPIOID ABUSE (HCC): Status: ACTIVE | Noted: 2019-03-17

## 2019-03-17 PROBLEM — G93.40 ACUTE ENCEPHALOPATHY: Status: ACTIVE | Noted: 2019-03-17

## 2019-03-17 PROBLEM — R45.1 AGITATION: Status: ACTIVE | Noted: 2019-03-17

## 2019-03-17 PROBLEM — I10 HTN (HYPERTENSION): Status: ACTIVE | Noted: 2019-03-17

## 2019-03-17 PROBLEM — Z91.199 NON-COMPLIANCE: Status: ACTIVE | Noted: 2019-03-17

## 2019-03-17 LAB
APPEARANCE UR: CLEAR
ARTERIAL PATENCY WRIST A: YES
ATRIAL RATE: 74 BPM
BACTERIA URNS QL MICRO: 0 /HPF
BASE EXCESS BLD CALC-SCNC: 1 MMOL/L
BDY SITE: ABNORMAL
BILIRUB UR QL: NEGATIVE
BODY TEMPERATURE: 98.6
CALCULATED P AXIS, ECG09: 60 DEGREES
CALCULATED R AXIS, ECG10: 69 DEGREES
CALCULATED T AXIS, ECG11: 8 DEGREES
CASTS URNS QL MICRO: ABNORMAL /LPF
CO2 BLD-SCNC: 29 MMOL/L
COLLECT TIME,HTIME: 705
COLOR UR: YELLOW
DIAGNOSIS, 93000: NORMAL
EPI CELLS #/AREA URNS HPF: ABNORMAL /HPF
EXHALED MINUTE VOLUME, VE: 12.9 L/MIN
GAS FLOW.O2 O2 DELIVERY SYS: ABNORMAL L/MIN
GAS FLOW.O2 SETTING OXYMISER: 10 BPM
GLUCOSE UR STRIP.AUTO-MCNC: NEGATIVE MG/DL
HCO3 BLD-SCNC: 27.7 MMOL/L (ref 22–26)
HGB UR QL STRIP: ABNORMAL
INSPIRATION.DURATION SETTING TIME VENT: 0.9 SEC
KETONES UR QL STRIP.AUTO: ABNORMAL MG/DL
LEUKOCYTE ESTERASE UR QL STRIP.AUTO: NEGATIVE
NITRITE UR QL STRIP.AUTO: NEGATIVE
O2/TOTAL GAS SETTING VFR VENT: 40 %
P-R INTERVAL, ECG05: 128 MS
PCO2 BLD: 51.9 MMHG (ref 35–45)
PH BLD: 7.33 [PH] (ref 7.35–7.45)
PH UR STRIP: 6 [PH] (ref 5–9)
PIP ISTAT,IPIP: 16
PO2 BLD: 92 MMHG (ref 75–100)
POTASSIUM SERPL-SCNC: 4.2 MMOL/L (ref 3.5–5.1)
PROT UR STRIP-MCNC: ABNORMAL MG/DL
Q-T INTERVAL, ECG07: 392 MS
QRS DURATION, ECG06: 96 MS
QTC CALCULATION (BEZET), ECG08: 435 MS
RBC #/AREA URNS HPF: ABNORMAL /HPF
SAO2 % BLD: 96 % (ref 95–98)
SERVICE CMNT-IMP: ABNORMAL
SP GR UR REFRACTOMETRY: 1.02 (ref 1–1.02)
SPECIMEN TYPE: ABNORMAL
UROBILINOGEN UR QL STRIP.AUTO: 1 EU/DL (ref 0.2–1)
VENTRICULAR RATE, ECG03: 74 BPM
VT SETTING VENT: 460 ML
WBC URNS QL MICRO: ABNORMAL /HPF

## 2019-03-17 PROCEDURE — 74011250637 HC RX REV CODE- 250/637: Performed by: INTERNAL MEDICINE

## 2019-03-17 PROCEDURE — 94640 AIRWAY INHALATION TREATMENT: CPT

## 2019-03-17 PROCEDURE — 74011000258 HC RX REV CODE- 258: Performed by: FAMILY MEDICINE

## 2019-03-17 PROCEDURE — 36600 WITHDRAWAL OF ARTERIAL BLOOD: CPT

## 2019-03-17 PROCEDURE — 74011000250 HC RX REV CODE- 250: Performed by: INTERNAL MEDICINE

## 2019-03-17 PROCEDURE — 71045 X-RAY EXAM CHEST 1 VIEW: CPT

## 2019-03-17 PROCEDURE — 74011250636 HC RX REV CODE- 250/636: Performed by: INTERNAL MEDICINE

## 2019-03-17 PROCEDURE — 84132 ASSAY OF SERUM POTASSIUM: CPT

## 2019-03-17 PROCEDURE — 82803 BLOOD GASES ANY COMBINATION: CPT

## 2019-03-17 PROCEDURE — 77010033678 HC OXYGEN DAILY

## 2019-03-17 PROCEDURE — 74011000258 HC RX REV CODE- 258: Performed by: INTERNAL MEDICINE

## 2019-03-17 PROCEDURE — 77030019605

## 2019-03-17 PROCEDURE — 94660 CPAP INITIATION&MGMT: CPT

## 2019-03-17 PROCEDURE — 74011000250 HC RX REV CODE- 250: Performed by: HOSPITALIST

## 2019-03-17 PROCEDURE — 77030020263 HC SOL INJ SOD CL0.9% LFCR 1000ML

## 2019-03-17 PROCEDURE — 74011250636 HC RX REV CODE- 250/636: Performed by: HOSPITALIST

## 2019-03-17 PROCEDURE — 74011000250 HC RX REV CODE- 250: Performed by: FAMILY MEDICINE

## 2019-03-17 PROCEDURE — 77030021907 HC KT URIN FOL O&M -A

## 2019-03-17 PROCEDURE — 81001 URINALYSIS AUTO W/SCOPE: CPT

## 2019-03-17 PROCEDURE — 36415 COLL VENOUS BLD VENIPUNCTURE: CPT

## 2019-03-17 PROCEDURE — 74011250637 HC RX REV CODE- 250/637: Performed by: HOSPITALIST

## 2019-03-17 PROCEDURE — 65610000001 HC ROOM ICU GENERAL

## 2019-03-17 PROCEDURE — 87040 BLOOD CULTURE FOR BACTERIA: CPT

## 2019-03-17 RX ORDER — BUDESONIDE 0.5 MG/2ML
500 INHALANT ORAL
Status: DISCONTINUED | OUTPATIENT
Start: 2019-03-17 | End: 2019-03-17

## 2019-03-17 RX ORDER — CLONIDINE HYDROCHLORIDE 0.2 MG/1
0.2 TABLET ORAL
Status: DISCONTINUED | OUTPATIENT
Start: 2019-03-17 | End: 2019-03-17

## 2019-03-17 RX ORDER — LEVALBUTEROL INHALATION SOLUTION 0.63 MG/3ML
0.63 SOLUTION RESPIRATORY (INHALATION)
Status: DISCONTINUED | OUTPATIENT
Start: 2019-03-17 | End: 2019-03-20 | Stop reason: HOSPADM

## 2019-03-17 RX ORDER — POTASSIUM CHLORIDE 14.9 MG/ML
20 INJECTION INTRAVENOUS
Status: COMPLETED | OUTPATIENT
Start: 2019-03-17 | End: 2019-03-17

## 2019-03-17 RX ORDER — VANCOMYCIN HYDROCHLORIDE
1250 EVERY 12 HOURS
Status: DISCONTINUED | OUTPATIENT
Start: 2019-03-17 | End: 2019-03-17 | Stop reason: SDUPTHER

## 2019-03-17 RX ORDER — BUDESONIDE 0.5 MG/2ML
500 INHALANT ORAL
Status: DISCONTINUED | OUTPATIENT
Start: 2019-03-17 | End: 2019-03-20 | Stop reason: HOSPADM

## 2019-03-17 RX ORDER — HALOPERIDOL 5 MG/ML
5 INJECTION INTRAMUSCULAR
Status: DISCONTINUED | OUTPATIENT
Start: 2019-03-17 | End: 2019-03-17

## 2019-03-17 RX ORDER — METOPROLOL TARTRATE 5 MG/5ML
5 INJECTION INTRAVENOUS
Status: DISPENSED | OUTPATIENT
Start: 2019-03-17 | End: 2019-03-18

## 2019-03-17 RX ORDER — HALOPERIDOL 5 MG/ML
4 INJECTION INTRAMUSCULAR
Status: DISCONTINUED | OUTPATIENT
Start: 2019-03-17 | End: 2019-03-20 | Stop reason: HOSPADM

## 2019-03-17 RX ORDER — SODIUM CHLORIDE 9 MG/ML
125 INJECTION, SOLUTION INTRAVENOUS CONTINUOUS
Status: DISCONTINUED | OUTPATIENT
Start: 2019-03-17 | End: 2019-03-20 | Stop reason: HOSPADM

## 2019-03-17 RX ORDER — ACETAMINOPHEN 325 MG/1
650 TABLET ORAL
Status: DISCONTINUED | OUTPATIENT
Start: 2019-03-17 | End: 2019-03-20 | Stop reason: HOSPADM

## 2019-03-17 RX ORDER — CLONIDINE 0.2 MG/24H
1 PATCH, EXTENDED RELEASE TRANSDERMAL
Status: DISCONTINUED | OUTPATIENT
Start: 2019-03-17 | End: 2019-03-17

## 2019-03-17 RX ORDER — HYDRALAZINE HYDROCHLORIDE 20 MG/ML
10 INJECTION INTRAMUSCULAR; INTRAVENOUS
Status: DISCONTINUED | OUTPATIENT
Start: 2019-03-17 | End: 2019-03-17

## 2019-03-17 RX ORDER — LEVALBUTEROL INHALATION SOLUTION 0.63 MG/3ML
0.63 SOLUTION RESPIRATORY (INHALATION)
Status: DISCONTINUED | OUTPATIENT
Start: 2019-03-17 | End: 2019-03-17

## 2019-03-17 RX ORDER — VANCOMYCIN HYDROCHLORIDE
1250 EVERY 12 HOURS
Status: DISCONTINUED | OUTPATIENT
Start: 2019-03-18 | End: 2019-03-19 | Stop reason: DRUGHIGH

## 2019-03-17 RX ORDER — ONDANSETRON 2 MG/ML
4 INJECTION INTRAMUSCULAR; INTRAVENOUS
Status: DISCONTINUED | OUTPATIENT
Start: 2019-03-17 | End: 2019-03-20 | Stop reason: HOSPADM

## 2019-03-17 RX ORDER — ENOXAPARIN SODIUM 100 MG/ML
40 INJECTION SUBCUTANEOUS EVERY 24 HOURS
Status: DISCONTINUED | OUTPATIENT
Start: 2019-03-17 | End: 2019-03-20 | Stop reason: HOSPADM

## 2019-03-17 RX ORDER — CLONIDINE 0.2 MG/24H
1 PATCH, EXTENDED RELEASE TRANSDERMAL
Status: DISCONTINUED | OUTPATIENT
Start: 2019-03-17 | End: 2019-03-20 | Stop reason: HOSPADM

## 2019-03-17 RX ORDER — VANCOMYCIN 1.75 GRAM/500 ML IN 0.9 % SODIUM CHLORIDE INTRAVENOUS
1750 ONCE
Status: COMPLETED | OUTPATIENT
Start: 2019-03-17 | End: 2019-03-18

## 2019-03-17 RX ORDER — HYDRALAZINE HYDROCHLORIDE 20 MG/ML
10 INJECTION INTRAMUSCULAR; INTRAVENOUS
Status: DISCONTINUED | OUTPATIENT
Start: 2019-03-17 | End: 2019-03-20 | Stop reason: HOSPADM

## 2019-03-17 RX ORDER — LEVOFLOXACIN 5 MG/ML
750 INJECTION, SOLUTION INTRAVENOUS EVERY 24 HOURS
Status: DISCONTINUED | OUTPATIENT
Start: 2019-03-17 | End: 2019-03-18

## 2019-03-17 RX ORDER — ACETAMINOPHEN 650 MG/1
650 SUPPOSITORY RECTAL
Status: DISCONTINUED | OUTPATIENT
Start: 2019-03-17 | End: 2019-03-20 | Stop reason: HOSPADM

## 2019-03-17 RX ORDER — LORAZEPAM 2 MG/ML
2 INJECTION INTRAMUSCULAR
Status: DISCONTINUED | OUTPATIENT
Start: 2019-03-17 | End: 2019-03-20 | Stop reason: HOSPADM

## 2019-03-17 RX ORDER — SODIUM CHLORIDE 0.9 % (FLUSH) 0.9 %
5-40 SYRINGE (ML) INJECTION EVERY 8 HOURS
Status: DISCONTINUED | OUTPATIENT
Start: 2019-03-17 | End: 2019-03-20 | Stop reason: HOSPADM

## 2019-03-17 RX ORDER — SODIUM CHLORIDE 0.9 % (FLUSH) 0.9 %
5-40 SYRINGE (ML) INJECTION AS NEEDED
Status: DISCONTINUED | OUTPATIENT
Start: 2019-03-17 | End: 2019-03-20 | Stop reason: HOSPADM

## 2019-03-17 RX ORDER — NICARDIPINE HYDROCHLORIDE 0.1 MG/ML
5 INJECTION INTRAVENOUS ONCE
Status: DISCONTINUED | OUTPATIENT
Start: 2019-03-17 | End: 2019-03-17 | Stop reason: SDUPTHER

## 2019-03-17 RX ORDER — IBUPROFEN 200 MG
1 TABLET ORAL
Status: DISCONTINUED | OUTPATIENT
Start: 2019-03-17 | End: 2019-03-20 | Stop reason: HOSPADM

## 2019-03-17 RX ADMIN — LEVALBUTEROL HYDROCHLORIDE 0.63 MG: 0.63 SOLUTION RESPIRATORY (INHALATION) at 18:00

## 2019-03-17 RX ADMIN — METOPROLOL TARTRATE 5 MG: 5 INJECTION INTRAVENOUS at 14:06

## 2019-03-17 RX ADMIN — SODIUM CHLORIDE 125 ML/HR: 900 INJECTION, SOLUTION INTRAVENOUS at 19:32

## 2019-03-17 RX ADMIN — SODIUM CHLORIDE 125 ML/HR: 900 INJECTION, SOLUTION INTRAVENOUS at 03:48

## 2019-03-17 RX ADMIN — POTASSIUM CHLORIDE 20 MEQ: 200 INJECTION, SOLUTION INTRAVENOUS at 04:16

## 2019-03-17 RX ADMIN — HYDRALAZINE HYDROCHLORIDE 10 MG: 20 INJECTION INTRAMUSCULAR; INTRAVENOUS at 01:52

## 2019-03-17 RX ADMIN — SODIUM CHLORIDE 10 ML: 9 INJECTION, SOLUTION INTRAMUSCULAR; INTRAVENOUS; SUBCUTANEOUS at 21:14

## 2019-03-17 RX ADMIN — SODIUM CHLORIDE 5 MG/HR: 900 INJECTION, SOLUTION INTRAVENOUS at 05:13

## 2019-03-17 RX ADMIN — LEVOFLOXACIN 750 MG: 5 INJECTION, SOLUTION INTRAVENOUS at 21:13

## 2019-03-17 RX ADMIN — LEVALBUTEROL HYDROCHLORIDE 0.63 MG: 0.63 SOLUTION RESPIRATORY (INHALATION) at 20:42

## 2019-03-17 RX ADMIN — LORAZEPAM 2 MG: 2 INJECTION INTRAMUSCULAR; INTRAVENOUS at 16:37

## 2019-03-17 RX ADMIN — METOPROLOL TARTRATE 5 MG: 5 INJECTION INTRAVENOUS at 03:04

## 2019-03-17 RX ADMIN — FAMOTIDINE 20 MG: 10 INJECTION, SOLUTION INTRAVENOUS at 21:14

## 2019-03-17 RX ADMIN — DEXMEDETOMIDINE HYDROCHLORIDE 0.8 MCG/KG/HR: 100 INJECTION, SOLUTION INTRAVENOUS at 20:27

## 2019-03-17 RX ADMIN — SODIUM CHLORIDE 10 ML: 9 INJECTION, SOLUTION INTRAMUSCULAR; INTRAVENOUS; SUBCUTANEOUS at 05:19

## 2019-03-17 RX ADMIN — VANCOMYCIN HYDROCHLORIDE 1750 MG: 10 INJECTION, POWDER, LYOPHILIZED, FOR SOLUTION INTRAVENOUS at 21:13

## 2019-03-17 RX ADMIN — DEXMEDETOMIDINE HYDROCHLORIDE 0.4 MCG/KG/HR: 100 INJECTION, SOLUTION INTRAVENOUS at 03:56

## 2019-03-17 RX ADMIN — BUDESONIDE 500 MCG: 0.5 INHALANT RESPIRATORY (INHALATION) at 18:00

## 2019-03-17 RX ADMIN — ENOXAPARIN SODIUM 40 MG: 40 INJECTION SUBCUTANEOUS at 08:16

## 2019-03-17 RX ADMIN — DEXMEDETOMIDINE HYDROCHLORIDE 0.7 MCG/KG/HR: 100 INJECTION, SOLUTION INTRAVENOUS at 21:26

## 2019-03-17 RX ADMIN — FAMOTIDINE 20 MG: 10 INJECTION, SOLUTION INTRAVENOUS at 09:10

## 2019-03-17 RX ADMIN — ACETAMINOPHEN 650 MG: 650 SUPPOSITORY RECTAL at 20:21

## 2019-03-17 RX ADMIN — SODIUM CHLORIDE 10 ML: 9 INJECTION, SOLUTION INTRAMUSCULAR; INTRAVENOUS; SUBCUTANEOUS at 14:00

## 2019-03-17 RX ADMIN — LORAZEPAM 2 MG: 2 INJECTION INTRAMUSCULAR; INTRAVENOUS at 16:32

## 2019-03-17 RX ADMIN — SODIUM CHLORIDE 125 ML/HR: 900 INJECTION, SOLUTION INTRAVENOUS at 11:43

## 2019-03-17 RX ADMIN — POTASSIUM CHLORIDE 20 MEQ: 200 INJECTION, SOLUTION INTRAVENOUS at 06:21

## 2019-03-17 RX ADMIN — SODIUM CHLORIDE 500 ML: 900 INJECTION, SOLUTION INTRAVENOUS at 23:32

## 2019-03-17 RX ADMIN — DEXMEDETOMIDINE HYDROCHLORIDE 0.9 MCG/KG/HR: 100 INJECTION, SOLUTION INTRAVENOUS at 18:22

## 2019-03-17 RX ADMIN — HYDRALAZINE HYDROCHLORIDE 10 MG: 20 INJECTION INTRAMUSCULAR; INTRAVENOUS at 13:04

## 2019-03-17 NOTE — PROGRESS NOTES
Pt's BP remains high after giving PRN hydralazine and lopressor. Spoke with Dr. Gonzalez  - order for catapres patch and to restart cardene gtt for now. Current /88. Monitoring closely.

## 2019-03-17 NOTE — ED NOTES
Pt continues to pull out his IVs and jerk and mumble. Able to get VS finally after low dose ketamine.  Pt placed on 2L O2 via NC.

## 2019-03-17 NOTE — PROGRESS NOTES
Patient admitted overnight due to severe agitation and confusion. Received several doses of sedatives. Drowsy now. Hypertensive. On IV prn hydralazine and lopressor. Clonidine patch ordered. Will monitor his clinical progress.

## 2019-03-17 NOTE — PROGRESS NOTES
Bedside and Verbal shift change report given to Tarik Ham RN (oncoming nurse) by Yazan Villasenor RN (offgoing nurse). Report included the following information SBAR, Kardex, ED Summary, Intake/Output, MAR, Recent Results and Cardiac Rhythm NSR. Pt in bed on bipap 40% with O2 sat %. Pt lethargic - will not open eyes but does move extremities when stimulated. Pt received ketamine, morphine and geodon in ED because pt was extremely agitated. Nasal trumpet in place. PERRLA 2 mm - pt does not focus or track with eyes. Lung sounds clear. NSR on monitor HR 79 and /76. Stomach flat and soft upon palpation with bowel sounds active. Galaviz in place. Pedal pulses palpable.     Potassium infusing  NS at 125

## 2019-03-17 NOTE — PROGRESS NOTES
TRANSFER - IN REPORT:    Verbal report received from Colton Fish RN(name) on Demetrio Lee  being received from  ED(unit) for routine progression of care      Report consisted of patients Situation, Background, Assessment and   Recommendations(SBAR). Information from the following report(s) SBAR, Kardex and ED Summary was reviewed with the receiving nurse. Opportunity for questions and clarification was provided. Assessment completed upon patients arrival to unit and care assumed.

## 2019-03-17 NOTE — PROGRESS NOTES
Pt calmly woke up for the first time since beginning of shift and was A&O X4. Pt quickly drifted back off to sleep.

## 2019-03-17 NOTE — PROGRESS NOTES
SBP in the 190s-200s.  Notified MD and verbal orders received to start cardene gtt to maintain SBP <180

## 2019-03-17 NOTE — ROUTINE PROCESS
TRANSFER - OUT REPORT:    Verbal report given to Jennifer Dutton RN on Avani Ou  being transferred to [facility] for routine progression of care       Report consisted of patients Situation, Background, Assessment and   Recommendations(SBAR). Information from the following report(s) SBAR, ED Summary, [facility] and Recent Results was reviewed with the receiving nurse. Lines:   Peripheral IV 03/16/19 Left Foot (Active)   Site Assessment Clean, dry, & intact 3/16/2019  9:51 PM   Phlebitis Assessment 0 3/16/2019  9:51 PM   Dressing Status Clean, dry, & intact 3/16/2019  9:51 PM       Peripheral IV 03/17/19 Anterior;Left;Proximal Forearm (Active)        Opportunity for questions and clarification was provided.       Patient transported with:   Monitor  Registered Nurse

## 2019-03-17 NOTE — PROGRESS NOTES
Pt spikes fevers  Hx may include IVD abuse  Mental status unchanged but on meds affecting it  CXR shows RLL pneumonia    Rx:  Stat blood CS x2  Vanco & Levaquin IV    Signed By: John Aranda MD     March 17, 2019

## 2019-03-17 NOTE — PROGRESS NOTES
Pt transferred in from the ED. VSS with HR in 70s, temp 98.5 oral, SBP 170s. Duel skin assessment performed with ANNEL Addison. Sacrum pink but blanchable- allevyn applied. Scabs and ecchymosis noted on bilateral upper and lower extremities.

## 2019-03-17 NOTE — H&P
INTERNAL MEDICINE H&P/CONSULT    Subjective:     Patient is a 60years old male with history of opioid dependence, HTN and non compliance who was recently started on methadone called the ambulance today because he though he may be having a seizure. He stayed he took a white pill. Initially in ED, he was lucid but w/ some confusion and quickly turned into lethargy and severe agitation. He was given multiple and large doses of benzo, ketamine, morphine and  geodon to control his agitation. At time on interview, he is severely lethargic. Past Medical History:   Diagnosis Date    Acute encephalopathy 3/17/2019    HTN (hypertension) 3/17/2019    Other ill-defined conditions(799.89)     back problems      Past Surgical History:   Procedure Laterality Date    HX APPENDECTOMY        Prior to Admission medications    Medication Sig Start Date End Date Taking? Authorizing Provider   losartan (COZAAR) 25 mg tablet Take 1 Tab by mouth daily. 10/8/18   Bynum Bosworth, DO   chlorthalidone (HYGROTEN) 25 mg tablet Take 1 Tab by mouth daily. 10/8/18   Bynum Bosworth, DO   lidocaine (SALONPAS/ASPERCREME) 4 % patch Apply to low back for pain relief once daily 8/17/18   Savanah Palomino NP     No Known Allergies   Social History     Tobacco Use    Smoking status: Current Every Day Smoker     Packs/day: 0.50    Smokeless tobacco: Never Used   Substance Use Topics    Alcohol use: No        Family History:  HTN    Review of Systems   Unobtainable dt mental status    Objective: Intake / Output:  No intake/output data recorded. No intake/output data recorded. Physical Exam:  Visit Vitals  /89   Pulse (!) 102   Resp 21   Ht 5' 9\" (1.753 m)   Wt 73 kg (161 lb)   SpO2 97%   BMI 23.78 kg/m²     General appearance: lethargic and agitated. Dry mucous membranes. Miotic  Head: Normocephalic, without obvious abnormality, atraumatic  Back: symmetric, no curvature. ROM normal. No CVA tenderness.   Lungs: clear to auscultation bilaterally -  Heart: regular rate and rhythm, S1, S2 normal, no murmur, click, rub or gallop. Abdomen: soft, no tenderness, no distension, normal bowel sound, no masses, no organomegaly  Extremities: atraumatic, no cyanosis - Bilateral lower limbs edema none. Skin: No rashes or ulceration. Neurologic: perrla, moves 4 limbs, DTR +2. Babiniski's reflex negative. Romberg's and gait tests are deferred at this time. ECG: sinus rhythm     Data Review (Labs):   Recent Results (from the past 24 hour(s))   DRUG SCREEN, URINE    Collection Time: 03/16/19  7:07 PM   Result Value Ref Range    PCP(PHENCYCLIDINE) NEGATIVE       BENZODIAZEPINES NEGATIVE       COCAINE NEGATIVE       AMPHETAMINES NEGATIVE       METHADONE POSITIVE      THC (TH-CANNABINOL) NEGATIVE       OPIATES POSITIVE      BARBITURATES NEGATIVE      CBC WITH AUTOMATED DIFF    Collection Time: 03/16/19  7:33 PM   Result Value Ref Range    WBC 13.2 (H) 4.3 - 11.1 K/uL    RBC 4.43 4.23 - 5.6 M/uL    HGB 13.1 (L) 13.6 - 17.2 g/dL    HCT 40.5 (L) 41.1 - 50.3 %    MCV 91.4 79.6 - 97.8 FL    MCH 29.6 26.1 - 32.9 PG    MCHC 32.3 31.4 - 35.0 g/dL    RDW 13.7 11.9 - 14.6 %    PLATELET 855 523 - 398 K/uL    MPV 9.0 (L) 9.4 - 12.3 FL    ABSOLUTE NRBC 0.00 0.0 - 0.2 K/uL    DF AUTOMATED      NEUTROPHILS 59 43 - 78 %    LYMPHOCYTES 29 13 - 44 %    MONOCYTES 10 4.0 - 12.0 %    EOSINOPHILS 2 0.5 - 7.8 %    BASOPHILS 0 0.0 - 2.0 %    IMMATURE GRANULOCYTES 0 0.0 - 5.0 %    ABS. NEUTROPHILS 7.7 1.7 - 8.2 K/UL    ABS. LYMPHOCYTES 3.9 0.5 - 4.6 K/UL    ABS. MONOCYTES 1.3 0.1 - 1.3 K/UL    ABS. EOSINOPHILS 0.2 0.0 - 0.8 K/UL    ABS. BASOPHILS 0.0 0.0 - 0.2 K/UL    ABS. IMM.  GRANS. 0.1 0.0 - 0.5 K/UL   METABOLIC PANEL, COMPREHENSIVE    Collection Time: 03/16/19  7:33 PM   Result Value Ref Range    Sodium 143 136 - 145 mmol/L    Potassium 3.2 (L) 3.5 - 5.1 mmol/L    Chloride 106 98 - 107 mmol/L    CO2 31 21 - 32 mmol/L    Anion gap 6 (L) 7 - 16 mmol/L    Glucose 60 (L) 65 - 100 mg/dL    BUN 22 6 - 23 MG/DL    Creatinine 0.97 0.8 - 1.5 MG/DL    GFR est AA >60 >60 ml/min/1.73m2    GFR est non-AA >60 >60 ml/min/1.73m2    Calcium 8.2 (L) 8.3 - 10.4 MG/DL    Bilirubin, total 0.2 0.2 - 1.1 MG/DL    ALT (SGPT) 139 (H) 12 - 65 U/L    AST (SGOT) 107 (H) 15 - 37 U/L    Alk. phosphatase 75 50 - 136 U/L    Protein, total 7.6 6.3 - 8.2 g/dL    Albumin 3.5 3.5 - 5.0 g/dL    Globulin 4.1 (H) 2.3 - 3.5 g/dL    A-G Ratio 0.9 (L) 1.2 - 3.5     SALICYLATE    Collection Time: 03/16/19  7:33 PM   Result Value Ref Range    Salicylate level 74.6 2.8 - 20.0 MG/DL   ACETAMINOPHEN    Collection Time: 03/16/19  7:33 PM   Result Value Ref Range    Acetaminophen level <2 (L) 10.0 - 30.0 ug/mL   ETHYL ALCOHOL    Collection Time: 03/16/19  7:33 PM   Result Value Ref Range    ALCOHOL(ETHYL),SERUM <3 MG/DL   CK    Collection Time: 03/16/19  7:33 PM   Result Value Ref Range     (H) 21 - 215 U/L   GLUCOSE, POC    Collection Time: 03/16/19 10:36 PM   Result Value Ref Range    Glucose (POC) 140 (H) 65 - 100 mg/dL       Assessment:       Agitation (3/17/2019)    Acute encephalopathy (3/17/2019) severe      Opioid abuse ([address] (3/17/2019)      Dehydration (3/17/2019)      HTN (hypertension) (3/17/2019) not well controlled      Non-compliance (3/17/2019)      Methadone dependence ([facility] [address] [facility] (3/17/2019)        Plan:     ICU  Benzo/ haldol as needed  IVF hydration  Blood pressure control  AM lab as needed  Patient is full code. Further management depends on patient progress. Thank you for the oppourtinity to contribute in the care of your patient. Time 15 minutes.     Signed By: Livan Nava MD     March 17, 2019

## 2019-03-18 PROBLEM — I10 HTN (HYPERTENSION): Chronic | Status: ACTIVE | Noted: 2019-03-17

## 2019-03-18 PROBLEM — F11.20 METHADONE DEPENDENCE (HCC): Chronic | Status: ACTIVE | Noted: 2019-03-17

## 2019-03-18 PROBLEM — F11.10 OPIOID ABUSE (HCC): Chronic | Status: ACTIVE | Noted: 2019-03-17

## 2019-03-18 PROBLEM — Z91.199 NON-COMPLIANCE: Chronic | Status: ACTIVE | Noted: 2019-03-17

## 2019-03-18 LAB
ALBUMIN SERPL-MCNC: 2.8 G/DL (ref 3.5–5)
ALBUMIN/GLOB SERPL: 0.8 {RATIO} (ref 1.2–3.5)
ALP SERPL-CCNC: 59 U/L (ref 50–136)
ALT SERPL-CCNC: 156 U/L (ref 12–65)
ANION GAP SERPL CALC-SCNC: 6 MMOL/L (ref 7–16)
ARTERIAL PATENCY WRIST A: YES
ARTERIAL PATENCY WRIST A: YES
AST SERPL-CCNC: 188 U/L (ref 15–37)
BASE DEFICIT BLD-SCNC: 2 MMOL/L
BASE EXCESS BLD CALC-SCNC: 0 MMOL/L
BDY SITE: ABNORMAL
BDY SITE: ABNORMAL
BILIRUB SERPL-MCNC: 0.8 MG/DL (ref 0.2–1.1)
BODY TEMPERATURE: 98.6
BODY TEMPERATURE: 98.6
BUN SERPL-MCNC: 25 MG/DL (ref 6–23)
CALCIUM SERPL-MCNC: 8.1 MG/DL (ref 8.3–10.4)
CHLORIDE SERPL-SCNC: 106 MMOL/L (ref 98–107)
CO2 BLD-SCNC: 26 MMOL/L
CO2 BLD-SCNC: 27 MMOL/L
CO2 SERPL-SCNC: 27 MMOL/L (ref 21–32)
COLLECT TIME,HTIME: 1
COLLECT TIME,HTIME: 2022
CREAT SERPL-MCNC: 0.78 MG/DL (ref 0.8–1.5)
ERYTHROCYTE [DISTWIDTH] IN BLOOD BY AUTOMATED COUNT: 14.1 % (ref 11.9–14.6)
EXHALED MINUTE VOLUME, VE: 9 L/MIN
FLOW RATE ISTAT,IFRATE: 1 L/MIN
GAS FLOW.O2 O2 DELIVERY SYS: ABNORMAL L/MIN
GAS FLOW.O2 O2 DELIVERY SYS: ABNORMAL L/MIN
GAS FLOW.O2 SETTING OXYMISER: 10 BPM
GLOBULIN SER CALC-MCNC: 3.6 G/DL (ref 2.3–3.5)
GLUCOSE BLD STRIP.AUTO-MCNC: 100 MG/DL (ref 65–100)
GLUCOSE SERPL-MCNC: 106 MG/DL (ref 65–100)
HCO3 BLD-SCNC: 24.6 MMOL/L (ref 22–26)
HCO3 BLD-SCNC: 25.3 MMOL/L (ref 22–26)
HCT VFR BLD AUTO: 42.4 % (ref 41.1–50.3)
HGB BLD-MCNC: 13.2 G/DL (ref 13.6–17.2)
INSPIRATION.DURATION SETTING TIME VENT: 0.9 SEC
MAGNESIUM SERPL-MCNC: 2 MG/DL (ref 1.8–2.4)
MCH RBC QN AUTO: 29.4 PG (ref 26.1–32.9)
MCHC RBC AUTO-ENTMCNC: 31.1 G/DL (ref 31.4–35)
MCV RBC AUTO: 94.4 FL (ref 79.6–97.8)
NRBC # BLD: 0 K/UL (ref 0–0.2)
O2/TOTAL GAS SETTING VFR VENT: 40 %
PCO2 BLD: 39 MMHG (ref 35–45)
PCO2 BLD: 53.5 MMHG (ref 35–45)
PEEP RESPIRATORY: 8 CMH2O
PH BLD: 7.28 [PH] (ref 7.35–7.45)
PH BLD: 7.41 [PH] (ref 7.35–7.45)
PIP ISTAT,IPIP: 15
PLATELET # BLD AUTO: 228 K/UL (ref 150–450)
PMV BLD AUTO: 9.3 FL (ref 9.4–12.3)
PO2 BLD: 70 MMHG (ref 75–100)
PO2 BLD: 73 MMHG (ref 75–100)
POTASSIUM SERPL-SCNC: 4.6 MMOL/L (ref 3.5–5.1)
PROT SERPL-MCNC: 6.4 G/DL (ref 6.3–8.2)
RBC # BLD AUTO: 4.49 M/UL (ref 4.23–5.6)
SAO2 % BLD: 91 % (ref 95–98)
SAO2 % BLD: 95 % (ref 95–98)
SERVICE CMNT-IMP: ABNORMAL
SODIUM SERPL-SCNC: 139 MMOL/L (ref 136–145)
SPECIMEN TYPE: ABNORMAL
SPECIMEN TYPE: ABNORMAL
SPONTANEOUS TIMED, IST: 15
VT SETTING VENT: 485 ML
WBC # BLD AUTO: 16.7 K/UL (ref 4.3–11.1)

## 2019-03-18 PROCEDURE — 77030020263 HC SOL INJ SOD CL0.9% LFCR 1000ML

## 2019-03-18 PROCEDURE — 74011000250 HC RX REV CODE- 250: Performed by: INTERNAL MEDICINE

## 2019-03-18 PROCEDURE — 74011250636 HC RX REV CODE- 250/636: Performed by: HOSPITALIST

## 2019-03-18 PROCEDURE — 74011250636 HC RX REV CODE- 250/636: Performed by: INTERNAL MEDICINE

## 2019-03-18 PROCEDURE — 65610000001 HC ROOM ICU GENERAL

## 2019-03-18 PROCEDURE — 94660 CPAP INITIATION&MGMT: CPT

## 2019-03-18 PROCEDURE — 83735 ASSAY OF MAGNESIUM: CPT

## 2019-03-18 PROCEDURE — 80053 COMPREHEN METABOLIC PANEL: CPT

## 2019-03-18 PROCEDURE — 36600 WITHDRAWAL OF ARTERIAL BLOOD: CPT

## 2019-03-18 PROCEDURE — 77010033678 HC OXYGEN DAILY

## 2019-03-18 PROCEDURE — 82962 GLUCOSE BLOOD TEST: CPT

## 2019-03-18 PROCEDURE — 94640 AIRWAY INHALATION TREATMENT: CPT

## 2019-03-18 PROCEDURE — 82803 BLOOD GASES ANY COMBINATION: CPT

## 2019-03-18 PROCEDURE — 74011000258 HC RX REV CODE- 258: Performed by: INTERNAL MEDICINE

## 2019-03-18 PROCEDURE — 36415 COLL VENOUS BLD VENIPUNCTURE: CPT

## 2019-03-18 PROCEDURE — 96361 HYDRATE IV INFUSION ADD-ON: CPT | Performed by: EMERGENCY MEDICINE

## 2019-03-18 PROCEDURE — 85027 COMPLETE CBC AUTOMATED: CPT

## 2019-03-18 RX ORDER — MORPHINE SULFATE 2 MG/ML
1 INJECTION, SOLUTION INTRAMUSCULAR; INTRAVENOUS
Status: DISCONTINUED | OUTPATIENT
Start: 2019-03-18 | End: 2019-03-20 | Stop reason: HOSPADM

## 2019-03-18 RX ORDER — ENALAPRILAT 1.25 MG/ML
1.25 INJECTION INTRAVENOUS
Status: DISCONTINUED | OUTPATIENT
Start: 2019-03-18 | End: 2019-03-20 | Stop reason: HOSPADM

## 2019-03-18 RX ADMIN — LEVALBUTEROL HYDROCHLORIDE 0.63 MG: 0.63 SOLUTION RESPIRATORY (INHALATION) at 16:47

## 2019-03-18 RX ADMIN — BUDESONIDE 500 MCG: 0.5 INHALANT RESPIRATORY (INHALATION) at 08:18

## 2019-03-18 RX ADMIN — ENOXAPARIN SODIUM 40 MG: 40 INJECTION SUBCUTANEOUS at 09:25

## 2019-03-18 RX ADMIN — VANCOMYCIN HYDROCHLORIDE 1250 MG: 10 INJECTION, POWDER, LYOPHILIZED, FOR SOLUTION INTRAVENOUS at 21:20

## 2019-03-18 RX ADMIN — LEVALBUTEROL HYDROCHLORIDE 0.63 MG: 0.63 SOLUTION RESPIRATORY (INHALATION) at 23:33

## 2019-03-18 RX ADMIN — PIPERACILLIN AND TAZOBACTAM 3.38 G: 3; .375 INJECTION, POWDER, FOR SOLUTION INTRAVENOUS at 09:26

## 2019-03-18 RX ADMIN — SODIUM CHLORIDE 10 ML: 9 INJECTION, SOLUTION INTRAMUSCULAR; INTRAVENOUS; SUBCUTANEOUS at 21:21

## 2019-03-18 RX ADMIN — FAMOTIDINE 20 MG: 10 INJECTION, SOLUTION INTRAVENOUS at 09:25

## 2019-03-18 RX ADMIN — PIPERACILLIN AND TAZOBACTAM 3.38 G: 3; .375 INJECTION, POWDER, FOR SOLUTION INTRAVENOUS at 16:30

## 2019-03-18 RX ADMIN — SODIUM CHLORIDE 125 ML/HR: 900 INJECTION, SOLUTION INTRAVENOUS at 08:11

## 2019-03-18 RX ADMIN — SODIUM CHLORIDE 10 ML: 9 INJECTION, SOLUTION INTRAMUSCULAR; INTRAVENOUS; SUBCUTANEOUS at 14:09

## 2019-03-18 RX ADMIN — LEVALBUTEROL HYDROCHLORIDE 0.63 MG: 0.63 SOLUTION RESPIRATORY (INHALATION) at 08:18

## 2019-03-18 RX ADMIN — SODIUM CHLORIDE 10 ML: 9 INJECTION, SOLUTION INTRAMUSCULAR; INTRAVENOUS; SUBCUTANEOUS at 05:07

## 2019-03-18 RX ADMIN — HYDRALAZINE HYDROCHLORIDE 10 MG: 20 INJECTION INTRAMUSCULAR; INTRAVENOUS at 16:30

## 2019-03-18 RX ADMIN — VANCOMYCIN HYDROCHLORIDE 1250 MG: 10 INJECTION, POWDER, LYOPHILIZED, FOR SOLUTION INTRAVENOUS at 09:25

## 2019-03-18 RX ADMIN — BUDESONIDE 500 MCG: 0.5 INHALANT RESPIRATORY (INHALATION) at 20:23

## 2019-03-18 RX ADMIN — FAMOTIDINE 20 MG: 10 INJECTION, SOLUTION INTRAVENOUS at 21:21

## 2019-03-18 RX ADMIN — SODIUM CHLORIDE 125 ML/HR: 900 INJECTION, SOLUTION INTRAVENOUS at 17:56

## 2019-03-18 NOTE — PROGRESS NOTES
Pt more lethargic and baraley withdrawals to pain, Map of 60. . Turned off precedex and notfied MD- verbal orders received for an ABG and 500 cc fluid bolus.

## 2019-03-18 NOTE — PROGRESS NOTES
Pt SBP increased to 180s. Repositioned BP cuff and rechecked. Repeat SBP in 200s. Administered PRN hydralazine 10 mg.

## 2019-03-18 NOTE — PROGRESS NOTES
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 Spoke with pt who is groggy and on BIPAP at present. Was able to confirm demographics. Call to [facility] [phone], spoke with Ron Kimbrough, [address],and ask them to do well check at address. They will return call once well check completed. CM will continue to follow pt for d/c needs. Care Management Interventions  PCP Verified by CM: No  Mode of Transport at Discharge:  Other (see comment)  Transition of Care Consult (CM Consult): Discharge Planning  Current Support Network: Own Home, Other(pt lives with father, whom he cares for. )  Freedom of Choice Offered: Yes  [place] Resource Information Provided?: (ATC Dual coverage )  Discharge Location  Discharge Placement: Unable to determine at this time

## 2019-03-18 NOTE — PROGRESS NOTES
Pharmacokinetic Consult to Pharmacist    Janes Deras is a 60 y.o. male being treated for concern for BSI. Height: 5' 9\" (175.3 cm)  Weight: 69 kg (152 lb 1.9 oz)  Lab Results   Component Value Date/Time    BUN 25 (H) 03/18/2019 03:38 AM    Creatinine 0.78 (L) 03/18/2019 03:38 AM    WBC 16.7 (H) 03/18/2019 03:38 AM      Estimated Creatinine Clearance: 99.5 mL/min (A) (based on SCr of 0.78 mg/dL (L)). CULTURES:  All Micro Results     Procedure Component Value Units Date/Time    CULTURE, BLOOD [[id number]] Collected:  03/17/19 2009    Order Status:  Completed Specimen:  Blood Updated:  03/18/19 0847     Special Requests: --        RIGHT  Antecubital       Culture result: NO GROWTH AFTER 12 HOURS       CULTURE, BLOOD [[id number]] Collected:  03/17/19 2018    Order Status:  Completed Specimen:  Blood Updated:  03/18/19 0847     Special Requests: --        LEFT  ARM       Culture result: NO GROWTH AFTER 12 HOURS             Day 2 of vancomycin. Goal trough is 15-20. I will continue current ordered dose of 1250mg q12h. Consider trough tomorrow night. Pharmacist will continue to follow patient. Please call with any questions. Thank you,  Amelia Welsh.  Georgina Corcoran, PharmD, [address] [facility]  Clinical Pharmacist  [phone]

## 2019-03-18 NOTE — PROGRESS NOTES
Progress Note    Patient: Maria Ines Kern MRN: [id number]  SSN: [id number]    YOB: 1960  Age: 60 y.o. Sex: male      Admit Date: 3/16/2019    LOS: 1 day     Subjective:     Patient is a 60years old male with history of opioid dependence, HTN and non compliance with medications who was recently started on methadone. Admitted on 3/17 with extreme severe agitation requiring the use of multiple sedatives in he er and finally being admitted to the ICU for BIPAP support and a precedex drip. He has also been very hypertensive and has been on and off a cardene drip. He is on a clonidine patch but is too drowsy and confused to take oral meds. Yesterday he developed fever and a chest x ray reported a RLL infiltrate. Started on IV Vanco + zosyn. Today he has remained sedated and with poor verbal interaction. Objective:     Vitals:    03/18/19 1702 03/18/19 1717 03/18/19 1719 03/18/19 1751   BP: (!) 167/96 (!) 180/105 (!) 185/108 (!) 194/103   Pulse: 85 85 87 81   Resp: 28 24 23    Temp:       SpO2: 99% 99% 97%    Weight:       Height:            Intake and Output:  Current Shift: 03/18 0701 - 03/18 1900  In: -   Out: 625 [Urine:625]  Last three shifts: 03/16 1901 - 03/18 0700  In: 3883.6 [I.V.:3883.6]  Out: 3000 [Urine:3000]    Physical Exam:   GENERAL: alert, appears stated age  EYE: conjunctivae/corneas clear. LYMPHATIC: Cervical, supraclavicular, and axillary nodes normal.   THROAT & NECK: normal and no erythema or exudates noted. LUNG: rales n the RL base   HEART: regular rate and rhythm, S1, S2 normal, no murmur, click, rub or gallop  ABDOMEN: soft, non-tender. Bowel sounds normal. No masses,  no organomegaly  EXTREMITIES:  extremities normal, atraumatic, no cyanosis or edema  SKIN: Normal.  NEUROLOGIC: drowsy, no focal deficits seen   PSYCHIATRIC: non focal    Lab/Data Review:  Lab results reviewed.  For significant abnormal values and values requiring intervention, see assessment and plan.         Assessment:     Principal Problem:    Agitation (3/17/2019)    Active Problems:    Acute encephalopathy (3/17/2019)      Opioid abuse ([facility] [address] (3/17/2019)      Dehydration (3/17/2019)      HTN (hypertension) (3/17/2019)      Non-compliance (3/17/2019)      Methadone dependence ([facility] [address] (3/17/2019)        Plan:     -on IV fluids   - continue precedex drip   -IV ativan prn   -clonidine patch for HTN  -cardene drip. Once he is more awake and able to take pills will order oral meds for his HTN  -on IV Vanco and zosyn for RLL pneumonia.  IF cultures remain neg consider stopping Vanco in the next 24-48 hours  -monitor Mental status   -speech and swallow eval once he is more awake     DVT ppx: SQ heparin     Signed By: Ewing Curling, MD     March 18, 2019

## 2019-03-18 NOTE — INTERDISCIPLINARY ROUNDS
Interdisciplinary team rounds were held 3/18/2019 with the following team members:Care Management, Nursing, Nurse Practitioner, Nutrition, Palliative Care, Pastoral Care, Pharmacy, Physical Therapy, Physician, Respiratory Therapy and Clinical Coordinator and the patient. Plan of care discussed. See clinical pathway and/or care plan for interventions and desired outcomes.

## 2019-03-18 NOTE — PROGRESS NOTES
Bedside and Verbal shift change report given to Terry Avery RN (oncoming nurse) by Tia Quiñonez RN (offgoing nurse). Report included the following information SBAR, Kardex, ED Summary, OR Summary, Procedure Summary and Intake/Output.

## 2019-03-18 NOTE — PROGRESS NOTES
Bedside shift change report given to Waqas Vang  (oncoming nurse) by Cate Layton RN (offgoing nurse). Report included the following information SBAR, Kardex, Intake/Output, MAR, Recent Results and Cardiac Rhythm NSR.

## 2019-03-18 NOTE — PROGRESS NOTES
Pt removed from BiPap and placed on 4L NC. Oxygen saturation 100%. RR 28. Pt resting comfortably with no s/sx of distress.

## 2019-03-18 NOTE — PROGRESS NOTES
Bedside shift report received from Viky Shore RN and shift assessment completed. Pt in bed on 1L NC. Pt opens eyes to voice, lethargic but oriented to person, place, and time. ST on monitor. Cardene gtt infusing @ 7.5 mg/hr to maintain SBP < 180. Will continue to monitor.

## 2019-03-18 NOTE — PROGRESS NOTES
Pt opened eyes and answered some questions then fell back to sleep- still lethargic. MAP of 80s now.

## 2019-03-18 NOTE — PROGRESS NOTES
Pt awake and agitated about father. States that he is the only person who can care for his dad. Case management notified.

## 2019-03-18 NOTE — ROUTINE PROCESS
Respiratory Care Services     Policy Number: [id number]    Title: Noninvasive Positive Pressure                         Ventilation, (BIPAP VISION) and            Respironics (V60)    Effective Date: 2005, 2017    Revised Date: 2012, 2014, 2015, 2016   I. Description: Non Invasive ventilation (NIV) is a ventilatory assist technique used as an alternative to endotracheal intubation. NIV is pressure ventilation delivered as BIPAP (Bi-level Positive Airway Pressure) which is a low pressure electronically driven device intended for use as a ventilatory support system for patients who have an intact respiratory drive. The device provides non-invasive ventilatory assistance through the use of a nasal or full face mask. BiPAP  (two levels of ventilatory support) known as inspired positive airway pressure (IPAP) and  positive airway support (EPAP). One level of support can also be delivered which is delivered as EPAP or CPAP which is delivered throughout the respiratory cycle. The aim is to maintain adequate ventilation and minimize the effort of breathing. The BIPAP Vision System and the Respironics V60 are the two systems available for non-invasive ventilation. These devices are also capable of being used for invasive ventilation in the critical care units only. BIPAP Vision: This device uses an electronic pressure control sensing monitor pressure differential in the patient circuit. This feedback allows for adjustment of the flow and pressure output  to assist in inhalation or exhalation through the administration at two distinct levels of positive pressure. During inspiration, the level is variably positive and is always higher than the expiratory level. During exhalation, pressure is variably positive or near ambient. In addition, this device has the ability to compensate for leaks through automatic               adjustment of the trigger threshold.  This capability allows for the application of BIPAP    for mask-applied ventilation assistance. Respironics V60  The Respironics V60 uses Auto-Trak technology to help ensure patient synchrony and therapy acceptance and is designed to address the specific challenges of NIV. By providing auto-adaptive leak compensation, inspiratory triggering, and expiratory cycling, Auto-Trak delivers optimal synchrony in the face of dynamic leak and changing patient demand. The Respironics V60 is designed to include pediatric use and is equipped with several modes, which allows the practitioner to meet the specific needs of the patients. See Appendix 1 for definitions of settings. II. Policy: The administration of non-invasive positive pressure ventilation (NPPV) will be the responsibility of the Respiratory Care Practitioner (RCP). Upon receipt of a physician order, proper patient instruction, set up and monitoring will be provided to ensure patient understanding, compliance and proper utilization of prescribed therapy. A. A patient may be allowed to use their own NPPV machine after having their equipment checked and approved by [employer]. B. A consent and Release for Home Ventilator form must be signed by the patient and witnessed by a health care provider if the patient chooses to use his home ventilator. C. A patient that is being treated for acute respiratory failure and placed on non-invasive ventilation must be placed in a critical care unit, per Medical Director. D.  A patient who is being treated for sleep apnea may be treated on the floors. E.   A patient has the option of using a hospital owned NPPV unit. F.   A patient may use a hospital unit and their own mask if they choose. G. Patients may be placed on full face mask with NPPV units on the hospital floors            under the following conditions:  1. Patient has an end stage disease process.   2. Patient was placed on full face mask and NPPV unit in the Critical Care Units and it has been established as safe and effective therapy. 3. Patient is ordered full face mask with NPPV unit for home use. 4. In the event patient is to be set up or transitioned to home on a NPPV unit, the Respironics V60 (in the AVAPS mode) shall be utilized while the patient is in the hospital. If a [facility] is unavailable, a home NPPV unit may be provided by the DME provider for no more than 48 hours. III. Responsibility: Director, Amira Jimenez, and all Respiratory Care          Practitioners with documented competency. IV. Indications for non-invasive ventilation:  A. Acute respiratory failure (Patient must be in Critical Care Unit)  B. Chronic respiratory failure  C. Alveolar hypoventilation  D. Documented sleep apnea  V. Contraindications:  A. Patients with severe respiratory failure without a spontaneous respiratory drive  B. Noninvasive ventilation may be contraindicated for patients with the followin. Inability to maintain a patent airway or adequately clear secretions  2. Acute sinusitis or otitis media  3. Risk for aspiration of gastric contents  4. Hypotension  5. Pre-existing pneumothorax or pneumomediastinum  6. Epistaxis  7. Recent facial, oral or skull surgery or trauma  8. history of allergy or sensitivity to mask materials where the risk from allergic reaction outweighs the benefit of ventilatory assistance  C. Potential Complications:  1. Cardiovascular compromise  2. Skin breakdown and discomfort from mask  3. Gastric distention  4. Increased intracranial pressure  5. Pulmonary barotraumas    VI. Mask fit  A. It is essential that the patient be correctly fitted with an appropriate size mask. 1. Choose mask according to sizing template on disposable setups.   2.  The mask should fit comfortably on the patients nose,  not occluding the            nares and the base of the mask should fit comfortably between the chin and bottom lip see picture on setup guide. 3.  Headgear should fit comfortably not tight. The bottom edge of the headgear        should sit at the base of the nape of the neck with side straps underneath the        earlobes. 4. The face masks are better for patients who are dyspneic and tachypneic as            they tend to mouth breathe with a nasal mask and reduce the effectiveness          of the ventilation. 5. Masks that are too tight are uncomfortable and cause pressure areas and              masks that are too loose leak which reduce the efficiency of the system. 6. When using a nasal mask the patient must keep their mouth closed to obtain       the desired effect of the ventilation. 7. Place an Allevyn Gentle Border dressing over bridge of nose to avoid skin          breakdown. VII. Equipment for BIPAP Vision  A. BIPAP Vision Ventilatory Support Unit  B. BIPAP Vision disposable circuit with proximal pressure and exhalation port. C. Main flow bacterial filter  D. Nasal or face mask and disposable head strap  F. Smooth inner lumen tubing for connecting the humidifier system to the BIPAP unit. G. Pulse oximetry equipment and supplies  H. Oxygen analyzer with circuit adapter  I. Device specific humidification system which must be used when using BIPAP. VIII. Procedure for Non-invasive ventilation via BIPAP Vision:    A. BIPAP Vision Set Up  1. Assemble the circuit with exhalation port proximal to the patient. 2.  A bacterial filter and oxygen analyzer should be place between the                             machines patient interface port and the patient circuit. If using the oxygen               module, connect to a 50 psi oxygen source. 3. Attach humidifier to the system. 4. Plug electrical cord in AC outlet. Press START on the back of the machine.    5. The Vision will perform a self-test as indicated by the display screen,                        System Self-Test in Progress.   6. Perform the Test Exhalation Port second button from top, left of screen. a) Insure that whistle port and pressure tubing are in line. b) Occlude end of whistle port at end of tubing throughout the test.  c) Press START TEST, top button right screen; this tests the leak of the            circuit. 7. Assess appropriateness of physicians orders and set ventilatory parameters accordingly. Initial settings as well as changes to ventilatory parameters must be accompanied by a physician order. 8. Select the proper mode by first selecting the mode button at the bottom of screen. a) Choose CPAP or ST mode, top button, right side of screen per  order. b) Activate view mode by pressing the [facility] button, bottom right of screen. 9. Select the parameters button below the screen. a) Choose a parameter from the left and right sides of screen. Press the soft button for the parameter of choice. Once it is highlighted, spin knob clockwise to increase value, and counter clockwise to decrease value in the parameter block. Repress the button for that particular parameter to activate the new value. b) Consult the BIPAP Vision Ventilatory Support System Clinical Manual for specific information on the modes of operation and set parameters. 10.   Select alarms button, below screen. Set values for Hi Pressure, Lo Pressure, Lo      Pressure Delay, Apnea, Lo Min Vent, Hi Rate, Lo Rate as appropriate for patient. B. Post Procedure -Cleaning and Sterilization for the BIPAP Vision  1. Before cleaning the unit, turn the Start/Stop switch to the Stop position and unplug the electrical cord from the wall and from the rear of the unit. 2. Clean the front panel with water or 70% Isopropyl Alcohol only. Do not immerse the Vision unit in water. 3. Clean the exterior of the enclosure with a hospital approved disinfectant solution. Do not allow any liquid to enter the inside of the ventilator.   4. Refer to the BIPAP Vision Ventilatory Support System Clinical Manual, Chapter 15 Cleaning and Routine Maintenance and Replacing the [facility]. IX. Procedure for non-invasive ventilation using the V60:  A. Set up machine circuit using disposable mask and circuit setups only. 1.  Ensure that there is always an exhalation port at the base of the mask for C02 to be . 2. Set mode and settings as per physician orders. 3.  See Appendix 1 for definitions of all modes, usual and alarm settings. 4. Set Alarms on machine. See usual alarm settings in appendix. 5.  Turn on machine and gently hold mask over nose/face until patient becomes accustomed to the airflow. 6. Attach the head strap. 7. Stay with patient until the 02 saturations and observations are stable. B. Monitor patients response for:   1. Decreased Heart rate, respiratory rate, and blood pressure. 2. Decreased sweating   3. Decreased work of breathing (as per baseline observations)   4. Patient feels more comfortable  5. Patient finds it easier to breathe     X. Monitoring:  A. While in use, the RCP should check the patient and non-invasive unit no less than every four hours. B. Failure of NIV should be suspected if:   1. The patient is unable to maintain adequate oxygenation, decreasing 02 saturations despite increases in 02.  2. There is a reduction in neurological or conscious state. 3. The patient has excessive secretions or increasing respiratory rate. 4. Failure of PaCO2 or PH to improve on ABG sample. 5. The patient has poorly compliant lungs. XI. Weaning                A. Weaning or cessation of non-invasive ventilation should occur under the following                        conditions:  1. PaC02 returns to normal and patient maintains O2 saturations with minimal                oxygen. 2. Respiratory rate is returned within normal limits. 3. Patient is unable to tolerate non-invasive ventilation.   4. Patients dyspnea is reduced. 5. Patient exhibits normal overnight ventilation. 6. Patient is receiving palliative treatment. XII. Documentation:  A. Documentation should include the followin. Ventilator settings comply with physician order. 2. Ventilator is functioning properly as evidenced by a check of measured volumes, rates, pressures and FIO2. 3. Alarms are set appropriately. 4. Measured inspired gas temperature (invasive mode only)  5. Vital signs (pulse, respiratory rate, oxygen saturation, breath sounds)  6. Patient tolerance to therapy. 7. Manual resuscitator and appropriate size mask at patient bedside      REFERENCES  BIPAP Vision [address],[facility] 280 Therapy and Respiratory Care Section. TAMEKA Sharma 2001. Introducing non-invasive positive pressure ventilation. Nursing Standard. 15,26, 42-45. [place]. 2003. Using non-invasive ventilation in acute wards: part 2. Nursing Standard. 18,1, 41-44. [place] 47. Use of continuous positive airway pressure (CPAP) in the critically ill-physiological principles. Critical Care [phone]     ROBB Valiente2002. Bi-level positive airway pressure (BiPAP) and acute cardiogenicpulmonary edema   ( ACPO) in the emergency department. Critical Care 15 (2):51-63    TOM Rm2002. Non-invasive BiPap-implementation of a new service. Intensive and Critical Care Nursing [phone]     GENA Palacios.,et al 2002. Non-invasive ventilation in acute respiratory failure.  [place] 57:192-211       DEFINITIONS AND USUAL SETTINGS                                                            APPENDIX 1                                                             Settings   Settings in  Active   CPAP Settings in  Active   BiPAP Description Range Usual Setting  Used in NIV         CPAP Mode                     Continuous Positive Airway  Pressure   4-24 cmH20 8-14     ST or  BiPAP MODE                           Spontaneous Timed or BiLevel Positive Airway Pressure Ventilation. Two level system of alternating during non- invasive ventilation in sync with breathing-set IPAP and EPAP      __     __   AVAPS Mode    (only available  on V60)          The volume-targeted AVAPS (average volume-assured pressure support) mode combines the attributes of pressure-controlled and volume-targeted ventilation. __     __       EPAP                                       Positive Airway Pressure. Recruits under ventilated alveoli to remain open during expiration by providing a constant pressure throughout resp. cycle. Must be less than or equal to IPAP    4-25 cmH20 5-14                 Settings Settings in Active CPAP Settings in Active BiPAP Description Range Usual Setting Used in NIV     IPAP                           Inspired Positive Airway Pressure provides pressure throughout the inspiratory phase to support patient ventilation  4-40 cmH20 10-20               I-time                    Inspiratory time- time taken to inspire in seconds  0.3 - 3.0 sec 1:3   FIO2                   Oxygen delivered  21- 100% As ordered         Ramp time                                      The Ramp Time function helps your patient adapt to ventilation by gradually increasing inspiratory and expiratory pressure over a set interval (minutes). This time gradually delivers pressures so to reduce patient anxiety and increases comfort. Off  or  5-45 minutes 10 minutes                   Rate (RR)          Patients respiratory rate 4- 60 BPM 4     Rise time          Speed at which the inspiratory pressure rises to the set pressure. 1-5  (1 is the fastest) Set at 3                   Patient Data  Data Description Range Usual setting in NIV   Breath phase/trigger Indicator  Bar in left hand corner. Colored according to breath trigger.   n/a n/a   PIP Positive Inspiratory pressure  0-50 n/a   Patient total leak Est. or unintentional leak 0-200 n/a   Patient trigger Patient triggered breaths as a percentage  0-100% Should be 100%   Respiratory rate Respiratory rate 0-90 n/a   Ti/Ttot Inspiratory duty cycle or inspiratory time divided by total cycle time  0-91% n/a   Minute volume Est. minute ventilation. TV x rate=MV  0-99 LPM n/a   Tidal volume Est.  tidal volume  0-3000 ml n/a     Alarms  Alarm Description Range Set  at   [facility] Rate High respiratory rate 5-90 10 breaths above patients breath rate   Low Rate Low respiratory rate alarm 1-89 BPM 5 breaths below patients breath rate   Hi VT High tidal volume alarm 200-2500 ml 200 ml above patients own   Low VT Low tidal volume alarm OFF - 1500 ml OFF   HIP High Inspiratory pressure alarm 5-50 cm H20 10 cm above IPAP   LIP Low inspiratory pressure alarm OFF, 1-40 cmH20 OFF   Lo VE Low minute vent alarm OFF, 0.1 to 99L/min OFF   LIP T Low inspiratory delay time 5-60 seconds 5 seconds       CPAP Settings BiPAP Settings     Set CPAP level as ordered Set breath rate as ordered     Set FIO2 as ordered Set I-time as 1.3     Set Ramp time as ordered Set EPAP as ordered     Set C-Flex at 3 Set IPAP as ordered      Set Rise time as ordered      Set FIO2 as ordered                                      Respiratory Care Services  Consent and Release for Home Ventilator      Patient Name: _________________________________________ Room: ___________    SSN: _______________________________           Account Number:  __________________    Use and care of my personal home ventilator, (invasive or non-invasive) mechanical life support device while at [facility] system has been discussed with me by my physician and I have been provided with an opportunity to ask questions which have been answered to my satisfaction. I also understand a respiratory care practitioner is not expected to remain in my room or general vicinity at all times.     It is understood that every precaution consistent with the best medical practice will be taken and I give Respiratory Care Services permission to monitor my ventilator during my hospital stay. I hereby release [employer] system, its agents, employees and medical staff from liability arising from any and all claims, demands, losses and damages to person and/or property as a result of the above mentioned requests. I certify that I have read and understand this consent agreement and release and that I am legally qualified to cornelio this authorization. ___________________  Date    _____________________________________        ________________________  Signature of Patient or Parent (of minor patient,                  Relationship (if other than Patient)  prison parent) if applicable. Closest Relative,  Guardian or legal Representative    _____________________________________  Witness  Legal representative is defined as person named by the court as a guardian, executor or , or having power of  specifically set forth to authorize this action.     [phone] (3/02, 7/14)   Consent and Release for Home Ventilator

## 2019-03-18 NOTE — PROGRESS NOTES
Call back from [facility] 99 with [facility], stating pt's father is ok. Came to door, alert and oriented, seemed in good state. Thought son might still be sleeping, did not know he was admitted to hospital. Will notify son that father is okay currently.

## 2019-03-19 PROBLEM — J18.9 PNA (PNEUMONIA): Status: ACTIVE | Noted: 2019-03-19

## 2019-03-19 LAB
ANION GAP SERPL CALC-SCNC: 7 MMOL/L (ref 7–16)
BUN SERPL-MCNC: 18 MG/DL (ref 6–23)
CALCIUM SERPL-MCNC: 8.2 MG/DL (ref 8.3–10.4)
CHLORIDE SERPL-SCNC: 106 MMOL/L (ref 98–107)
CO2 SERPL-SCNC: 27 MMOL/L (ref 21–32)
CREAT SERPL-MCNC: 0.61 MG/DL (ref 0.8–1.5)
ERYTHROCYTE [DISTWIDTH] IN BLOOD BY AUTOMATED COUNT: 14 % (ref 11.9–14.6)
GLUCOSE SERPL-MCNC: 86 MG/DL (ref 65–100)
HCT VFR BLD AUTO: 38.4 % (ref 41.1–50.3)
HGB BLD-MCNC: 12.1 G/DL (ref 13.6–17.2)
MCH RBC QN AUTO: 29.2 PG (ref 26.1–32.9)
MCHC RBC AUTO-ENTMCNC: 31.5 G/DL (ref 31.4–35)
MCV RBC AUTO: 92.8 FL (ref 79.6–97.8)
NRBC # BLD: 0 K/UL (ref 0–0.2)
PLATELET # BLD AUTO: 205 K/UL (ref 150–450)
PMV BLD AUTO: 9.7 FL (ref 9.4–12.3)
POTASSIUM SERPL-SCNC: 3.6 MMOL/L (ref 3.5–5.1)
RBC # BLD AUTO: 4.14 M/UL (ref 4.23–5.6)
SODIUM SERPL-SCNC: 140 MMOL/L (ref 136–145)
VANCOMYCIN TROUGH SERPL-MCNC: 6.6 UG/ML (ref 5–20)
WBC # BLD AUTO: 11.5 K/UL (ref 4.3–11.1)

## 2019-03-19 PROCEDURE — 74011250637 HC RX REV CODE- 250/637: Performed by: HOSPITALIST

## 2019-03-19 PROCEDURE — 94640 AIRWAY INHALATION TREATMENT: CPT

## 2019-03-19 PROCEDURE — 74011000250 HC RX REV CODE- 250: Performed by: INTERNAL MEDICINE

## 2019-03-19 PROCEDURE — 74011000250 HC RX REV CODE- 250: Performed by: HOSPITALIST

## 2019-03-19 PROCEDURE — 74011000258 HC RX REV CODE- 258: Performed by: INTERNAL MEDICINE

## 2019-03-19 PROCEDURE — 94760 N-INVAS EAR/PLS OXIMETRY 1: CPT

## 2019-03-19 PROCEDURE — 65660000000 HC RM CCU STEPDOWN

## 2019-03-19 PROCEDURE — 80202 ASSAY OF VANCOMYCIN: CPT

## 2019-03-19 PROCEDURE — 74011250636 HC RX REV CODE- 250/636: Performed by: INTERNAL MEDICINE

## 2019-03-19 PROCEDURE — 94660 CPAP INITIATION&MGMT: CPT

## 2019-03-19 PROCEDURE — 77010033678 HC OXYGEN DAILY

## 2019-03-19 PROCEDURE — 74011250636 HC RX REV CODE- 250/636: Performed by: FAMILY MEDICINE

## 2019-03-19 PROCEDURE — 80048 BASIC METABOLIC PNL TOTAL CA: CPT

## 2019-03-19 PROCEDURE — 74011250636 HC RX REV CODE- 250/636: Performed by: HOSPITALIST

## 2019-03-19 PROCEDURE — 92610 EVALUATE SWALLOWING FUNCTION: CPT

## 2019-03-19 PROCEDURE — 85027 COMPLETE CBC AUTOMATED: CPT

## 2019-03-19 PROCEDURE — 77030020263 HC SOL INJ SOD CL0.9% LFCR 1000ML

## 2019-03-19 PROCEDURE — 36415 COLL VENOUS BLD VENIPUNCTURE: CPT

## 2019-03-19 RX ORDER — VANCOMYCIN HYDROCHLORIDE
1250 EVERY 8 HOURS
Status: DISCONTINUED | OUTPATIENT
Start: 2019-03-19 | End: 2019-03-20

## 2019-03-19 RX ADMIN — SODIUM CHLORIDE 125 ML/HR: 900 INJECTION, SOLUTION INTRAVENOUS at 11:47

## 2019-03-19 RX ADMIN — ENALAPRILAT 1.25 MG: 1.25 INJECTION INTRAVENOUS at 22:36

## 2019-03-19 RX ADMIN — SODIUM CHLORIDE 125 ML/HR: 900 INJECTION, SOLUTION INTRAVENOUS at 19:37

## 2019-03-19 RX ADMIN — SODIUM CHLORIDE 10 ML: 9 INJECTION, SOLUTION INTRAMUSCULAR; INTRAVENOUS; SUBCUTANEOUS at 14:05

## 2019-03-19 RX ADMIN — SODIUM CHLORIDE 10 ML: 9 INJECTION, SOLUTION INTRAMUSCULAR; INTRAVENOUS; SUBCUTANEOUS at 22:37

## 2019-03-19 RX ADMIN — VANCOMYCIN HYDROCHLORIDE 1250 MG: 10 INJECTION, POWDER, LYOPHILIZED, FOR SOLUTION INTRAVENOUS at 08:31

## 2019-03-19 RX ADMIN — FAMOTIDINE 20 MG: 10 INJECTION, SOLUTION INTRAVENOUS at 22:35

## 2019-03-19 RX ADMIN — LEVALBUTEROL HYDROCHLORIDE 0.63 MG: 0.63 SOLUTION RESPIRATORY (INHALATION) at 16:54

## 2019-03-19 RX ADMIN — PIPERACILLIN AND TAZOBACTAM 3.38 G: 3; .375 INJECTION, POWDER, FOR SOLUTION INTRAVENOUS at 16:57

## 2019-03-19 RX ADMIN — SODIUM CHLORIDE 125 ML/HR: 900 INJECTION, SOLUTION INTRAVENOUS at 00:15

## 2019-03-19 RX ADMIN — ACETAMINOPHEN 650 MG: 325 TABLET, FILM COATED ORAL at 16:49

## 2019-03-19 RX ADMIN — BUDESONIDE 500 MCG: 0.5 INHALANT RESPIRATORY (INHALATION) at 20:29

## 2019-03-19 RX ADMIN — ENOXAPARIN SODIUM 40 MG: 40 INJECTION SUBCUTANEOUS at 08:31

## 2019-03-19 RX ADMIN — PIPERACILLIN AND TAZOBACTAM 3.38 G: 3; .375 INJECTION, POWDER, FOR SOLUTION INTRAVENOUS at 08:31

## 2019-03-19 RX ADMIN — HYDRALAZINE HYDROCHLORIDE 10 MG: 20 INJECTION INTRAMUSCULAR; INTRAVENOUS at 12:08

## 2019-03-19 RX ADMIN — PIPERACILLIN AND TAZOBACTAM 3.38 G: 3; .375 INJECTION, POWDER, FOR SOLUTION INTRAVENOUS at 01:01

## 2019-03-19 RX ADMIN — VANCOMYCIN HYDROCHLORIDE 1250 MG: 10 INJECTION, POWDER, LYOPHILIZED, FOR SOLUTION INTRAVENOUS at 22:55

## 2019-03-19 RX ADMIN — LEVALBUTEROL HYDROCHLORIDE 0.63 MG: 0.63 SOLUTION RESPIRATORY (INHALATION) at 23:31

## 2019-03-19 RX ADMIN — BUDESONIDE 500 MCG: 0.5 INHALANT RESPIRATORY (INHALATION) at 07:32

## 2019-03-19 RX ADMIN — SODIUM CHLORIDE 10 ML: 9 INJECTION, SOLUTION INTRAMUSCULAR; INTRAVENOUS; SUBCUTANEOUS at 05:51

## 2019-03-19 RX ADMIN — FAMOTIDINE 20 MG: 10 INJECTION, SOLUTION INTRAVENOUS at 08:31

## 2019-03-19 RX ADMIN — LEVALBUTEROL HYDROCHLORIDE 0.63 MG: 0.63 SOLUTION RESPIRATORY (INHALATION) at 07:32

## 2019-03-19 NOTE — PROGRESS NOTES
Patient with large of amount of cash in personal belongings ($608.00); security slip filled out with Cary Nichole RN and patient as witnesses. Patient requests money to be locked up in safe, he thinks \"I had five $20.00 bills that are missing\". He denies knowing how long they have been missing for. Patient signed security slip and acknowledged that money would be picked up by security and sent to hospitals. Money and signed slip placed in envelope and sealed in front of patient. Security office called and arrived to take envelope to safe; patient notified.

## 2019-03-19 NOTE — PROGRESS NOTES
A follow up visit was made to the patient. Emotional support, spiritual presence and   prayer were provided. Patient requested prayer for his father who has dementia.       [employer]

## 2019-03-19 NOTE — PROGRESS NOTES
TRANSFER - OUT REPORT:    Verbal report given to 609 (name) on Dylon Haines  being transferred to [phone] (unit) for routine progression of care       Report consisted of patients Situation, Background, Assessment and   Recommendations(SBAR). Information from the following report(s) SBAR, Kardex, ED Summary, Intake/Output, MAR, Accordion, Recent Results, Med Rec Status, Cardiac Rhythm SR w/ PACs, Alarm Parameters  and Quality Measures was reviewed with the receiving nurse. Lines:   Peripheral IV 03/17/19 Anterior;Left;Proximal Forearm (Active)   Site Assessment Clean, dry, & intact 3/19/2019  7:16 AM   Phlebitis Assessment 0 3/19/2019  7:16 AM   Infiltration Assessment 0 3/19/2019  7:16 AM   Dressing Status Clean, dry, & intact 3/19/2019  7:16 AM   Dressing Type Tape;Transparent 3/19/2019  7:16 AM   Hub Color/Line Status Flushed;Patent; Infusing;Green 3/19/2019  7:16 AM   Alcohol Cap Used No 3/19/2019  7:16 AM       Peripheral IV 03/17/19 Right;Upper Arm (Active)   Site Assessment Clean, dry, & intact 3/19/2019  7:16 AM   Phlebitis Assessment 0 3/19/2019  7:16 AM   Infiltration Assessment 0 3/19/2019  7:16 AM   Dressing Status Clean, dry, & intact 3/19/2019  7:16 AM   Dressing Type Tape;Transparent 3/19/2019  7:16 AM   Hub Color/Line Status Pink;Flushed;Patent; Infusing 3/19/2019  7:16 AM   Alcohol Cap Used No 3/19/2019  7:16 AM        Opportunity for questions and clarification was provided.       Patient transported with:   [employer]

## 2019-03-19 NOTE — PROGRESS NOTES
Initial visit made to patient and a prayer was provided. A  card was left. The patient was not alert.         [employer]

## 2019-03-19 NOTE — PROGRESS NOTES
ABG drawn to assess need for BiPAP therapy. ABG results are acceptable with no critical values. RN notified of results. No changes made. Will continue BiPAP PRN and suggest pt wear it through the night since he snores when asleep.

## 2019-03-19 NOTE — PROGRESS NOTES
Update received from [facility] and patient's father is doing well. Patient updated on wellness check.

## 2019-03-19 NOTE — PROGRESS NOTES
LTG: Patient will tolerate least restrictive diet without overt signs or symptoms of airway compromise. STG: Patient will tolerate mechanical soft diet and thin liquids without overt signs or symptoms of airway compromise. STG: Patient will participate continued po trials of solid textures to assess appropriateness for diet upgrade. Speech language pathology: bedside swallow note: Initial Assessment    NAME/AGE/GENDER: Quita Andersen is a 60 y.o. male  DATE: 3/19/2019  PRIMARY DIAGNOSIS: Agitation [R45.1]  Acute encephalopathy [G93.40]      ICD-10: Treatment Diagnosis: R13.11 Oral Dysphagia. INTERDISCIPLINARY COLLABORATION: Registered Nurse  PRECAUTIONS/ALLERGIES: Patient has no known allergies. ASSESSMENT:Based on the objective data described below, Mr. Diogo Hrap presents with mild oral dysphagia secondary to drowsiness. Patient woke to voice, but delayed responses and increasingly drowsy as session progressed. He was presented with thin liquid via tsp, cup, and straw; puree; and mixed consistencies. Prolonged mastication of chewable textures secondary to drowsiness. Minimal verbal cues to maintain alertness during mastication. Adequate oral clearing after the swallow. Timely swallow upon palpation with all textures. No overt s/s of airway compromise. Recommend mechanical soft diet/thin liquids secondary to drowsiness. Anticipate upgrade to regular diet once level of alertness improves. Speech to follow regarding diet tolerance and po trials for potential upgrade. Patient will benefit from skilled intervention to address the below impairments. ?????? ? ? This section established at most recent assessment??????????  PROBLEM LIST (Impairments causing functional limitations):  1. Oral dysphaiga  REHABILITATION POTENTIAL FOR STATED GOALS: Excellent  PLAN OF CARE:   Patient will benefit from skilled intervention to address the following impairments.   RECOMMENDATIONS AND PLANNED INTERVENTIONS (Benefits and precautions of therapy have been discussed with the patient.):  · PO:  Mechanical soft  · Liquids:  regular thin  MEDICATIONS:  · With liquid  ASPIRATION PRECAUTIONS:  · Slow rate of intake  · Small bites/sips  · Upright at 90 degrees during meal  COMPENSATORY STRATEGIES/MODIFICATIONS INCLUDING:  · None  OTHER RECOMMENDATIONS (including follow up treatment recommendations):   · Patient education  RECOMMENDED DIET MODIFICATIONS DISCUSSED WITH:  · Nursing  · Patient  FREQUENCY/DURATION: Continue to follow patient 2 times a week for duration of hospital stay to address above goals. RECOMMENDED REHABILITATION/EQUIPMENT: (at time of discharge pending progress): Due to the probability of continued deficits (see above) this patient will likely need continued skilled speech therapy after discharge. SUBJECTIVE:   Sleeping upon arrival, but easily woke with verbal stim. Quickly following back asleep if not stimulated during session  History of Present Injury/Illness: Mr. Alonzo Moore  has a past medical history of Acute encephalopathy (3/17/2019), HTN (hypertension) (3/17/2019), and Other ill-defined conditions(799.89). He also has no past medical history of Arthritis, Asthma, Autoimmune disease ([address] CAD (coronary artery disease), Cancer ([address] Chronic kidney disease, COPD, Dementia, Diabetes ([address] Endocrine disease, Gastrointestinal disorder, Heart failure ([address] Infectious disease, Liver disease, Psychiatric disorder, PUD (peptic ulcer disease), Seizures ([address] Sleep disorder, Stroke ([address] or Thromboembolus ([address] .  He also  has a past surgical history that includes hx appendectomy. Present Symptoms: oral dysphagia   Pain Scale 1: Numeric (0 - 10)  Pain Intensity 1: 0  Current Medications:   No current facility-administered medications on file prior to encounter. Current Outpatient Medications on File Prior to Encounter   Medication Sig Dispense Refill    losartan (COZAAR) 25 mg tablet Take 1 Tab by mouth daily. 30 Tab 5    chlorthalidone (HYGROTEN) 25 mg tablet Take 1 Tab by mouth daily. 30 Tab 5    lidocaine (SALONPAS/ASPERCREME) 4 % patch Apply to low back for pain relief once daily 10 Patch 0     Current Dietary Status:     NPO       OBJECTIVE:   Respiratory Status:  Nasal cannula  1 l/min    Oral Motor Structure/Speech:  Oral-Motor Structure/Motor Speech  Labial: No impairment  Dentition: Intact, Limited  Lingual: No impairment    Cognitive and Communication Status:  Neurologic State: Eyes open to voice  Orientation Level: Oriented to person;Oriented to place;Oriented to time  Cognition: Follows commands  Perception: Appears intact  Perseveration: No perseveration noted  Safety/Judgement: Awareness of environment    BEDSIDE SWALLOW EVALUATION  Oral Assessment:  Oral Assessment  Labial: No impairment  Dentition: Intact; Limited  Lingual: No impairment  P.O. Trials: The patient was given tsp-small sips amounts of the following:   Consistency Presented: Thin liquid;Mixed consistency;Puree  How Presented: SLP-fed/presented;Cup/sip;Straw;Successive swallows    ORAL PHASE:  Bolus Acceptance: No impairment  Bolus Formation/Control: Impaired  Propulsion: No impairment  Type of Impairment: Delayed;Mastication  Oral Residue: None    PHARYNGEAL PHASE:  Initiation of Swallow: No impairment  Laryngeal Elevation: Functional  Aspiration Signs/Symptoms: None  Vocal Quality: No impairment     Effective Modifications: None     Pharyngeal Phase Characteristics: Poor endurance    OTHER OBSERVATIONS:  Rate/bite size: Questionable   Endurance:  Questionable   Comments:       Tool Used: Dysphagia Outcome and Severity Scale (JOSEPH)    Score Comments   Normal Diet  [] 7 With no strategies or extra time needed   Functional Swallow  [] 6 May have mild oral or pharyngeal delay       Mild Dysphagia    [x] 5 Which may require one diet consistency restricted (those who demonstrate penetration which is entirely cleared on MBS would be included) Mild-Moderate Dysphagia  [] 4 With 1-2 diet consistencies restricted       Moderate Dysphagia  [] 3 With 2 or more diet consistencies restricted       Moderately Severe Dysphagia  [] 2 With partial PO strategies (trials with ST only)       Severe Dysphagia  [] 1 With inability to tolerate any PO safely          Score:  Initial: 5 Most Recent: X (Date: --)   Interpretation of Tool: The Dysphagia Outcome and Severity Scale (JOSEPH) is a simple, easy-to-use, 7-point scale developed to systematically rate the functional severity of dysphagia based on objective assessment and make recommendations for diet level, independence level, and type of nutrition. Payor: Vandana Benavidez / Plan: [employer]AGE / Product Type: Managed Care Medicare /     TREATMENT:    (In addition to Assessment/Re-Assessment sessions the following treatments were rendered)  Assessment/Reassessment only, no treatment provided today  MODALITIES:                                                                    ORAL MOTOR  EXERCISES:                                                                                                                                                                      LARYNGEAL / PHARYNGEAL EXERCISES:                                                                                                                                     __________________________________________________________________________________________________  Safety:   After treatment position/precautions:  · Upright in Bed. Progression/Medical Necessity:   · Patient is expected to demonstrate progress in swallow function, diet tolerance and swallow safety to improve swallow safety and work toward diet advancement. Compliance with Program/Exercises: Will assess as treatment progresses  Reason for Continuation of Services/Other Comments:  · Patient continues to require skilled intervention due to oral dysphagia.   Recommendations/Intent for next treatment session: \"Treatment next visit will focus on diet tolerance, po trials for potential upgrade\".     Total Treatment Duration:  Time In: 0818  Time Out: [facility], [address] CCC-SLP

## 2019-03-19 NOTE — PROGRESS NOTES
TRANSFER - IN REPORT:    Verbal report received from   Chin(name) on Roz Smith  being received from ICU(unit) for routine progression of care      Report consisted of patients Situation, Background, Assessment and   Recommendations(SBAR). Information from the following report(s) SBAR was reviewed with the receiving nurse. Opportunity for questions and clarification was provided. Assessment completed upon patients arrival to unit and care assumed.

## 2019-03-19 NOTE — PROGRESS NOTES
Had message to call Ivana HOOKER with [facility] -[phone] ext: [phone], return call, no answer, with message left to return call.

## 2019-03-19 NOTE — PROGRESS NOTES
Patient says he wants to leave AMA d/t concern of his 78 y.o. Father with dementia and no family to check on him, MD Amy Joseph at bedside advising patient against leaving now and asking that he stay one more night and most likely be discharged in a. [address] selma's office contacted to schedule well-check; patient updated and will await call from [address].

## 2019-03-19 NOTE — PROGRESS NOTES
Bedside, Verbal and Written shift change report given to Chung Romano RN (oncoming nurse) by Fabiola Wick RN (offgoing nurse). Report included the following information SBAR, Kardex, ED Summary, Intake/Output, MAR, Accordion, Recent Results, Med Rec Status, Cardiac Rhythm SR/ST, Alarm Parameters  and Quality Measures.

## 2019-03-19 NOTE — PROGRESS NOTES
Hospitalist Progress Note     Admit Date:  3/16/2019  7:04 PM   Name:  Roz Smith   Age:  60 y.o.  :  1960   MRN:  [id number]   PCP:  Bianca Vera, Not On File  Treatment Team: Attending Provider: Delma Toribio MD; Utilization Review: Liv Foy RN; Care Manager: Twyla Johnson RN    Subjective:     Pt sleeping, arousable, c/o generalized weakness. Says he was shaking - hence called ems. Says takes 130 mg of methadone - also on bp meds  Has mittens. Had speech evaluation- on mechanical soft diet.       Objective:     Patient Vitals for the past 24 hrs:   Temp Pulse Resp BP SpO2   19 0847 -- 77 26 149/79 96 %   19 0832 -- 79 24 144/80 95 %   19 0816 -- 80 20 138/73 96 %   19 0802 -- 82 23 138/67 97 %   19 0747 -- 76 20 140/75 100 %   19 0732 -- 75 20 144/76 100 %   19 0716 -- 78 17 141/74 97 %   19 0701 -- 79 17 139/81 98 %   19 0647 -- 75 20 147/82 99 %   19 0642 -- -- -- -- 99 %   19 0632 -- 81 -- 134/77 99 %   19 0616 -- 82 29 152/86 98 %   19 0601 -- 80 24 144/80 100 %   19 0547 -- 80 25 155/82 99 %   19 0532 -- 84 -- 147/82 99 %   19 0516 -- 82 24 147/83 99 %   19 0501 -- 82 22 153/80 98 %   19 0447 -- 80 21 144/86 99 %   19 0432 -- 83 22 143/79 98 %   19 0416 -- 82 21 139/82 98 %   19 0401 -- 83 21 132/78 98 %   19 0347 -- 85 24 137/80 98 %   19 0332 -- 84 28 150/83 99 %   19 0330 -- -- -- -- 99 %   19 0321 98.5 °F (36.9 °C) 86 23 145/84 99 %   19 0317 -- 93 22 145/84 99 %   19 0301 -- 83 19 135/77 98 %   19 0247 -- 88 21 138/81 98 %   19 0232 -- 91 (!) 37 133/78 98 %   19 0217 -- 91 20 135/81 98 %   19 0201 -- 94 -- 131/79 98 %   19 0147 -- 94 -- 141/79 98 %   19 0132 -- 97 -- 124/80 98 %   19 0117 -- 98 24 132/75 98 %   19 0101 -- 100 21 145/80 97 %   19 0047 -- 99 25 130/80 97 %   03/19/19 0032 -- 97 23 129/78 97 %   03/19/19 0017 -- 97 22 125/75 97 %   03/19/19 0001 -- 100 22 133/82 97 %   03/18/19 2346 -- 100 24 131/81 96 %   03/18/19 2333 -- -- -- -- 97 %   03/18/19 2332 -- (!) 101 22 138/81 97 %   03/18/19 2317 -- 100 22 133/77 96 %   03/18/19 2314 99.9 °F (37.7 °C) 99 28 134/76 96 %   03/18/19 2301 -- 100 25 134/76 96 %   03/18/19 2246 -- 100 23 131/71 96 %   03/18/19 2232 -- (!) 103 -- 138/76 96 %   03/18/19 2217 -- (!) 102 28 128/70 95 %   03/18/19 2201 -- (!) 105 -- 122/66 95 %   03/18/19 2146 -- (!) 106 -- 122/67 95 %   03/18/19 2132 -- (!) 104 -- 127/68 95 %   03/18/19 2120 -- -- -- -- 94 %   03/18/19 2117 -- (!) 103 -- 123/65 94 %   03/18/19 2101 -- (!) 101 -- 122/64 94 %   03/18/19 2046 -- (!) 105 26 136/66 94 %   03/18/19 2032 -- (!) 104 27 139/70 95 %   03/18/19 2023 -- -- -- -- 98 %   03/18/19 2017 -- (!) 106 22 148/74 96 %   03/18/19 2001 -- (!) 104 26 142/69 95 %   03/18/19 1946 -- (!) 102 19 149/75 95 %   03/18/19 1932 -- (!) 104 20 148/76 95 %   03/18/19 1921 99.9 °F (37.7 °C) (!) 103 20 149/74 95 %   03/18/19 1917 -- (!) 104 25 149/74 95 %   03/18/19 1901 -- (!) 101 22 158/80 95 %   03/18/19 1846 -- (!) 101 20 157/82 94 %   03/18/19 1833 -- 98 16 165/86 95 %   03/18/19 1824 -- 98 -- 182/87 --   03/18/19 1817 -- 95 20 182/87 95 %   03/18/19 1802 -- 86 30 (!) 191/99 98 %   03/18/19 1751 -- 81 -- (!) 194/103 --   03/18/19 1749 -- 84 23 (!) 194/103 98 %   03/18/19 1746 -- 85 18 (!) 192/108 99 %   03/18/19 1732 -- 90 23 178/86 97 %   03/18/19 1719 -- 87 23 (!) 185/108 97 %   03/18/19 1717 -- 85 24 (!) 180/105 99 %   03/18/19 1702 -- 85 28 (!) 167/96 99 %   03/18/19 1647 -- -- -- -- 99 %   03/18/19 1636 98.8 °F (37.1 °C) 79 20 (!) 176/101 98 %   03/18/19 1632 -- 78 20 (!) 203/97 96 %   03/18/19 1631 -- 77 19 (!) 165/92 99 %   03/18/19 1630 -- 76 -- (!) 180/91 --   03/18/19 1617 -- 77 24 (!) 180/97 98 %   03/18/19 1602 -- 79 23 174/89 100 %   03/18/19 1546 -- 80 (!) 31 (!) 158/94 98 %   03/18/19 1532 -- 78 28 (!) 164/93 99 %   03/18/19 1517 -- 80 25 (!) 168/91 98 %   03/18/19 1502 -- 80 22 167/89 99 %   03/18/19 1446 -- 77 26 178/86 99 %   03/18/19 1432 -- 77 22 176/85 100 %   03/18/19 1417 -- 81 26 160/83 99 %   03/18/19 1402 -- 79 (!) 33 (!) 164/92 100 %   03/18/19 1346 -- 77 25 159/89 100 %   03/18/19 1332 -- 76 (!) 31 (!) 164/97 100 %   03/18/19 1317 -- 71 24 173/85 100 %   03/18/19 1302 -- 73 22 148/87 100 %   03/18/19 1246 -- 72 23 148/81 100 %   03/18/19 1231 -- 74 22 156/88 100 %   03/18/19 1217 98.3 °F (36.8 °C) 73 22 159/86 99 %   03/18/19 1202 -- 75 22 155/79 99 %   03/18/19 1146 -- 76 27 143/85 99 %   03/18/19 1131 -- 78 (!) 35 137/74 98 %   03/18/19 1117 -- 78 23 155/84 99 %   03/18/19 1105 -- -- -- -- 99 %   03/18/19 1102 -- 81 23 145/84 99 %   03/18/19 1046 -- 84 25 167/86 99 %   03/18/19 1031 -- 85 26 166/84 99 %   03/18/19 1017 -- 85 21 (!) 158/91 99 %   03/18/19 1002 -- 82 25 158/85 100 %   03/18/19 0946 -- 86 21 153/84 100 %   03/18/19 0931 -- 83 27 157/82 100 %   03/18/19 0917 -- 85 29 150/82 100 %     Oxygen Therapy  O2 Sat (%): 96 % (03/19/19 0847)  Pulse via Oximetry: 74 beats per minute (03/19/19 0847)  O2 Device: Nasal cannula (03/19/19 0732)  O2 Flow Rate (L/min): 1 l/min (03/19/19 0732)  O2 Temperature: 87.8 °F (31 °C) (03/19/19 0330)  FIO2 (%): 30 % (03/19/19 0330)    Intake/Output Summary (Last 24 hours) at 3/19/2019 0914  Last data filed at 3/19/2019 0558  Gross per 24 hour   Intake 3809.72 ml   Output 3000 ml   Net 809.72 ml         General:    Well nourished. Sleepy,arousable, on oxygen  heent- normal  CV:   RRR. No murmur, rub, or gallop. Lungs:   Bilateral crepitation rt> left  Abdomen:   Soft, nontender, nondistended. Cns- no focal neurological deficts  Extremities: Warm and dry. No cyanosis or edema. Skin:     No rashes or jaundice.      Data Review:  I have reviewed all labs, meds, telemetry events, and studies from the last 24 hours. Recent Results (from the past 24 hour(s))   POC G3    Collection Time: 03/18/19  8:25 PM   Result Value Ref Range    Device: NASAL CANNULA      pH (POC) 7.408 7.35 - 7.45      pCO2 (POC) 39.0 35 - 45 MMHG    pO2 (POC) 73 (L) 75 - 100 MMHG    HCO3 (POC) 24.6 22 - 26 MMOL/L    sO2 (POC) 95 95 - 98 %    Base excess (POC) 0 mmol/L    Allens test (POC) YES      Site RIGHT RADIAL      Patient temp.  98.6      Specimen type (POC) ARTERIAL      Performed by [facility]     CO2, POC 26 MMOL/L    Flow rate (POC) 1.000 L/min    COLLECT TIME 2,022     CBC W/O DIFF    Collection Time: 03/19/19  3:40 AM   Result Value Ref Range    WBC 11.5 (H) 4.3 - 11.1 K/uL    RBC 4.14 (L) 4.23 - 5.6 M/uL    HGB 12.1 (L) 13.6 - 17.2 g/dL    HCT 38.4 (L) 41.1 - 50.3 %    MCV 92.8 79.6 - 97.8 FL    MCH 29.2 26.1 - 32.9 PG    MCHC 31.5 31.4 - 35.0 g/dL    RDW 14.0 11.9 - 14.6 %    PLATELET 785 404 - 433 K/uL    MPV 9.7 9.4 - 12.3 FL    ABSOLUTE NRBC 0.00 0.0 - 0.2 K/uL   METABOLIC PANEL, BASIC    Collection Time: 03/19/19  3:40 AM   Result Value Ref Range    Sodium 140 136 - 145 mmol/L    Potassium 3.6 3.5 - 5.1 mmol/L    Chloride 106 98 - 107 mmol/L    CO2 27 21 - 32 mmol/L    Anion gap 7 7 - 16 mmol/L    Glucose 86 65 - 100 mg/dL    BUN 18 6 - 23 MG/DL    Creatinine 0.61 (L) 0.8 - 1.5 MG/DL    GFR est AA >60 >60 ml/min/1.73m2    GFR est non-AA >60 >60 ml/min/1.73m2    Calcium 8.2 (L) 8.3 - 10.4 MG/DL        All Micro Results     Procedure Component Value Units Date/Time    CULTURE, BLOOD [[id number]] Collected:  03/17/19 2009    Order Status:  Completed Specimen:  Blood Updated:  03/19/19 0633     Special Requests: --        RIGHT  Antecubital       Culture result: NO GROWTH 2 DAYS       CULTURE, BLOOD [[id number]] Collected:  03/17/19 2018    Order Status:  Completed Specimen:  Blood Updated:  03/19/19 1199     Special Requests: --        LEFT  ARM       Culture result: NO GROWTH 2 DAYS             Current Meds:  Current Facility-Administered Medications   Medication Dose Route Frequency    piperacillin-tazobactam (ZOSYN) 3.375 g in 0.9% sodium chloride (MBP/ADV) 100 mL  3.375 g IntraVENous Q8H    enalaprilat (VASOTEC) injection 1.25 mg  1.25 mg IntraVENous Q6H PRN    morphine injection 1 mg  1 mg IntraVENous Q4H PRN    hydrALAZINE (APRESOLINE) 20 mg/mL injection 10 mg  10 mg IntraVENous Q6H PRN    0.9% sodium chloride infusion  125 mL/hr IntraVENous CONTINUOUS    sodium chloride (NS) flush 5-40 mL  5-40 mL IntraVENous Q8H    sodium chloride (NS) flush 5-40 mL  5-40 mL IntraVENous PRN    acetaminophen (TYLENOL) tablet 650 mg  650 mg Oral Q4H PRN    nicotine (NICODERM CQ) 21 mg/24 hr patch 1 Patch  1 Patch TransDERmal DAILY PRN    enoxaparin (LOVENOX) injection 40 mg  40 mg SubCUTAneous Q24H    LORazepam (ATIVAN) injection 2 mg  2 mg IntraVENous Q2H PRN    dexmedeTOMidine (PRECEDEX) 400 mcg in 0.9% sodium chloride 100 mL infusion  0.2-1 mcg/kg/hr IntraVENous TITRATE    niCARdipine (CARDENE) 25 mg in 0.9% sodium chloride 250 mL infusion  0-15 mg/hr IntraVENous TITRATE    famotidine (PF) (PEPCID) 20 mg in sodium chloride 0.9% 10 mL injection  20 mg IntraVENous Q12H    ondansetron (ZOFRAN) injection 4 mg  4 mg IntraVENous Q6H PRN    haloperidol lactate (HALDOL) injection 4 mg  4 mg IntraVENous Q6H PRN    cloNIDine (CATAPRES) 0.2 mg/24 hr patch 1 Patch  1 Patch TransDERmal Q7D    budesonide (PULMICORT) 500 mcg/2 ml nebulizer suspension  500 mcg Nebulization BID RT    levalbuterol (XOPENEX) nebulizer soln 0.63 mg/3 mL  0.63 mg Nebulization Q8H RT    acetaminophen (TYLENOL) suppository 650 mg  650 mg Rectal Q4H PRN    vancomycin (VANCOCIN) 1250 mg in  ml infusion  1,250 mg IntraVENous Q12H       Other Studies (last 24 hours):  No results found.     Assessment and Plan:     Hospital Problems as of 3/19/2019 Date Reviewed: 10/8/2018          Codes Class Noted - Resolved POA    PNA (pneumonia) ICD-10-CM: J18.9  ICD-9-CM: 806  3/19/2019 - Present Unknown        Acute encephalopathy ICD-10-CM: G93.40  ICD-9-CM: 348.30  3/17/2019 - Present Yes        * (Principal) Agitation ICD-10-CM: R45.1  ICD-9-CM: 307.9  3/17/2019 - Present Yes        Opioid abuse (HCC) (Chronic) ICD-10-CM: F11.10  ICD-9-CM: 305.50  3/17/2019 - Present Yes        Dehydration ICD-10-CM: E86.0  ICD-9-CM: 276.51  3/17/2019 - Present Yes        HTN (hypertension) (Chronic) ICD-10-CM: I10  ICD-9-CM: 401.9  3/17/2019 - Present Yes        Non-compliance (Chronic) ICD-10-CM: Z91.19  ICD-9-CM: V15.81  3/17/2019 - Present Yes        Methadone dependence (HCC) (Chronic) ICD-10-CM: F11.20  ICD-9-CM: 304.00  3/17/2019 - Present Yes              PLAN:    Acute metabolic encephalopthy/agitation? Etiology--off precedex drip- resolved  pna- on zosyn  Malignant htn- off cardene drip- on clonidine patch  On chronic methadone      DC planning/Dispo:    DVT ppx:  lovenox.     Signed:  Gabe Chamorro MD

## 2019-03-19 NOTE — PROGRESS NOTES
Patient placed on BiPAP for the night. Tolerating mask well.      03/18/19 2120   Oxygen Therapy   O2 Sat (%) 94 %   Pulse via Oximetry 103 beats per minute   O2 Device BIPAP   O2 Temperature 86 °F (30 °C)   FIO2 (%) 30 %   Respiratory   Respiratory (WDL) X   Respiratory Pattern Regular   Chest/Tracheal Assessment Chest expansion, symmetrical   Breath Sounds Bilateral Diminished   CPAP/BIPAP   CPAP/BIPAP Start/Stop On   Device Mode BIPAP;S/T   Mask Type and Size Full face; Other (comment)  (XL)   Skin Condition intact; no redness   PIP Observed 13 cm H20   IPAP (cm H2O) 12 cm H2O   EPAP (cm H2O) 8 cm H2O   Inspiratory Time (sec) 0.9 seconds   Vt Spont (ml) 427 ml   Ve Observed (l/min) 9 l/min   Backup Rate 10   Total RR (Spontaneous) 21 breaths per minute   Insp Rise Time (sec) 3   Leak (Estimated) 15 L/min   Pt's Home Machine No   Biomedical Check Performed Yes   Settings Verified Yes   Alarm Settings   High Pressure 30   Low Pressure 2   Apnea 20   Low Ve 3   High Rate 50   Low Rate 5   Pulmonary Toilet   Pulmonary Toilet H. O.B elevated;Cough and deep breath

## 2019-03-20 VITALS
HEIGHT: 69 IN | TEMPERATURE: 97.7 F | HEART RATE: 74 BPM | WEIGHT: 155.65 LBS | RESPIRATION RATE: 18 BRPM | SYSTOLIC BLOOD PRESSURE: 140 MMHG | OXYGEN SATURATION: 96 % | BODY MASS INDEX: 23.05 KG/M2 | DIASTOLIC BLOOD PRESSURE: 83 MMHG

## 2019-03-20 LAB
ANION GAP SERPL CALC-SCNC: 9 MMOL/L (ref 7–16)
BASOPHILS # BLD: 0 K/UL (ref 0–0.2)
BASOPHILS NFR BLD: 0 % (ref 0–2)
BUN SERPL-MCNC: 15 MG/DL (ref 6–23)
CALCIUM SERPL-MCNC: 8.3 MG/DL (ref 8.3–10.4)
CHLORIDE SERPL-SCNC: 106 MMOL/L (ref 98–107)
CO2 SERPL-SCNC: 26 MMOL/L (ref 21–32)
CREAT SERPL-MCNC: 0.54 MG/DL (ref 0.8–1.5)
DIFFERENTIAL METHOD BLD: ABNORMAL
EOSINOPHIL # BLD: 0.1 K/UL (ref 0–0.8)
EOSINOPHIL NFR BLD: 1 % (ref 0.5–7.8)
ERYTHROCYTE [DISTWIDTH] IN BLOOD BY AUTOMATED COUNT: 14 % (ref 11.9–14.6)
GLUCOSE SERPL-MCNC: 77 MG/DL (ref 65–100)
HCT VFR BLD AUTO: 34.8 % (ref 41.1–50.3)
HGB BLD-MCNC: 11.5 G/DL (ref 13.6–17.2)
IMM GRANULOCYTES # BLD AUTO: 0 K/UL (ref 0–0.5)
IMM GRANULOCYTES NFR BLD AUTO: 0 % (ref 0–5)
LYMPHOCYTES # BLD: 1.7 K/UL (ref 0.5–4.6)
LYMPHOCYTES NFR BLD: 19 % (ref 13–44)
MCH RBC QN AUTO: 29.9 PG (ref 26.1–32.9)
MCHC RBC AUTO-ENTMCNC: 33 G/DL (ref 31.4–35)
MCV RBC AUTO: 90.4 FL (ref 79.6–97.8)
MONOCYTES # BLD: 0.7 K/UL (ref 0.1–1.3)
MONOCYTES NFR BLD: 8 % (ref 4–12)
NEUTS SEG # BLD: 6.6 K/UL (ref 1.7–8.2)
NEUTS SEG NFR BLD: 72 % (ref 43–78)
NRBC # BLD: 0 K/UL (ref 0–0.2)
PLATELET # BLD AUTO: 205 K/UL (ref 150–450)
PMV BLD AUTO: 9.9 FL (ref 9.4–12.3)
POTASSIUM SERPL-SCNC: 3.2 MMOL/L (ref 3.5–5.1)
RBC # BLD AUTO: 3.85 M/UL (ref 4.23–5.6)
SODIUM SERPL-SCNC: 141 MMOL/L (ref 136–145)
WBC # BLD AUTO: 9.1 K/UL (ref 4.3–11.1)

## 2019-03-20 PROCEDURE — 74011000258 HC RX REV CODE- 258: Performed by: INTERNAL MEDICINE

## 2019-03-20 PROCEDURE — 74011250637 HC RX REV CODE- 250/637: Performed by: INTERNAL MEDICINE

## 2019-03-20 PROCEDURE — 77030020263 HC SOL INJ SOD CL0.9% LFCR 1000ML

## 2019-03-20 PROCEDURE — 80048 BASIC METABOLIC PNL TOTAL CA: CPT

## 2019-03-20 PROCEDURE — 85025 COMPLETE CBC W/AUTO DIFF WBC: CPT

## 2019-03-20 PROCEDURE — 94640 AIRWAY INHALATION TREATMENT: CPT

## 2019-03-20 PROCEDURE — 94760 N-INVAS EAR/PLS OXIMETRY 1: CPT

## 2019-03-20 PROCEDURE — 74011000250 HC RX REV CODE- 250: Performed by: INTERNAL MEDICINE

## 2019-03-20 PROCEDURE — 36415 COLL VENOUS BLD VENIPUNCTURE: CPT

## 2019-03-20 PROCEDURE — 74011250636 HC RX REV CODE- 250/636: Performed by: INTERNAL MEDICINE

## 2019-03-20 PROCEDURE — 74011250636 HC RX REV CODE- 250/636: Performed by: HOSPITALIST

## 2019-03-20 RX ORDER — AMOXICILLIN AND CLAVULANATE POTASSIUM 875; 125 MG/1; MG/1
1 TABLET, FILM COATED ORAL EVERY 12 HOURS
Status: DISCONTINUED | OUTPATIENT
Start: 2019-03-20 | End: 2019-03-20 | Stop reason: HOSPADM

## 2019-03-20 RX ORDER — AMOXICILLIN AND CLAVULANATE POTASSIUM 875; 125 MG/1; MG/1
1 TABLET, FILM COATED ORAL 2 TIMES DAILY
Qty: 10 TAB | Refills: 0 | Status: SHIPPED | OUTPATIENT
Start: 2019-03-20 | End: 2019-03-25

## 2019-03-20 RX ORDER — POTASSIUM CHLORIDE 20 MEQ/1
40 TABLET, EXTENDED RELEASE ORAL
Status: COMPLETED | OUTPATIENT
Start: 2019-03-20 | End: 2019-03-20

## 2019-03-20 RX ADMIN — ENOXAPARIN SODIUM 40 MG: 40 INJECTION SUBCUTANEOUS at 08:16

## 2019-03-20 RX ADMIN — LEVALBUTEROL HYDROCHLORIDE 0.63 MG: 0.63 SOLUTION RESPIRATORY (INHALATION) at 07:10

## 2019-03-20 RX ADMIN — BUDESONIDE 500 MCG: 0.5 INHALANT RESPIRATORY (INHALATION) at 07:10

## 2019-03-20 RX ADMIN — ENALAPRILAT 1.25 MG: 1.25 INJECTION INTRAVENOUS at 06:28

## 2019-03-20 RX ADMIN — AMOXICILLIN AND CLAVULANATE POTASSIUM 1 TABLET: 875; 125 TABLET, FILM COATED ORAL at 08:16

## 2019-03-20 RX ADMIN — VANCOMYCIN HYDROCHLORIDE 1250 MG: 10 INJECTION, POWDER, LYOPHILIZED, FOR SOLUTION INTRAVENOUS at 06:31

## 2019-03-20 RX ADMIN — PIPERACILLIN AND TAZOBACTAM 3.38 G: 3; .375 INJECTION, POWDER, FOR SOLUTION INTRAVENOUS at 00:43

## 2019-03-20 RX ADMIN — POTASSIUM CHLORIDE 40 MEQ: 20 TABLET, EXTENDED RELEASE ORAL at 08:16

## 2019-03-20 RX ADMIN — SODIUM CHLORIDE 10 ML: 9 INJECTION, SOLUTION INTRAMUSCULAR; INTRAVENOUS; SUBCUTANEOUS at 06:00

## 2019-03-20 RX ADMIN — FAMOTIDINE 20 MG: 10 INJECTION, SOLUTION INTRAVENOUS at 08:16

## 2019-03-20 NOTE — DISCHARGE INSTRUCTIONS
DISCHARGE SUMMARY from Nurse    PATIENT INSTRUCTIONS:    After general anesthesia or intravenous sedation, for 24 hours or while taking prescription Narcotics:  · Limit your activities  · Do not drive and operate hazardous machinery  · Do not make important personal or business decisions  · Do  not drink alcoholic beverages  · If you have not urinated within 8 hours after discharge, please contact your surgeon on call. Report the following to your surgeon:  · Excessive pain, swelling, redness or odor of or around the surgical area  · Temperature over 100.5  · Nausea and vomiting lasting longer than 4 hours or if unable to take medications  · Any signs of decreased circulation or nerve impairment to extremity: change in color, persistent  numbness, tingling, coldness or increase pain  · Any questions    What to do at Home:  Recommended activity: Activity as tolerated,     If you experience any of the following symptoms fever, chills, nausea or vomiting, new or unrelieved pain, or any other worrisome symptoms, please follow up with PCP. *  Please give a list of your current medications to your Primary Care Provider. *  Please update this list whenever your medications are discontinued, doses are      changed, or new medications (including over-the-counter products) are added. *  Please carry medication information at all times in case of emergency situations. These are general instructions for a healthy lifestyle:    No smoking/ No tobacco products/ Avoid exposure to second hand smoke  Surgeon General's Warning:  Quitting smoking now greatly reduces serious risk to your health.     Obesity, smoking, and sedentary lifestyle greatly increases your risk for illness    A healthy diet, regular physical exercise & weight monitoring are important for maintaining a healthy lifestyle    You may be retaining fluid if you have a history of heart failure or if you experience any of the following symptoms:  Weight gain of 3 pounds or more overnight or 5 pounds in a week, increased swelling in our hands or feet or shortness of breath while lying flat in bed. Please call your doctor as soon as you notice any of these symptoms; do not wait until your next office visit. Recognize signs and symptoms of STROKE:    F-face looks uneven    A-arms unable to move or move unevenly    S-speech slurred or non-existent    T-time-call 911 as soon as signs and symptoms begin-DO NOT go       Back to bed or wait to see if you get better-TIME IS BRAIN. Warning Signs of HEART ATTACK     Call 911 if you have these symptoms:   Chest discomfort. Most heart attacks involve discomfort in the center of the chest that lasts more than a few minutes, or that goes away and comes back. It can feel like uncomfortable pressure, squeezing, fullness, or pain.  Discomfort in other areas of the upper body. Symptoms can include pain or discomfort in one or both arms, the back, neck, jaw, or stomach.  Shortness of breath with or without chest discomfort.  Other signs may include breaking out in a cold sweat, nausea, or lightheadedness. Don't wait more than five minutes to call 911 - MINUTES MATTER! Fast action can save your life. Calling 911 is almost always the fastest way to get lifesaving treatment. Emergency Medical Services staff can begin treatment when they arrive -- up to an hour sooner than if someone gets to the hospital by car. The discharge information has been reviewed with the patient. The patient verbalized understanding. Discharge medications reviewed with the patient and appropriate educational materials and side effects teaching were provided.   ___________________________________________________________________________________________________________________________________

## 2019-03-20 NOTE — PROGRESS NOTES
Pt to DC home today. No needs voiced. Care Management Interventions  PCP Verified by CM: No  Mode of Transport at Discharge:  Other (see comment)  Transition of Care Consult (CM Consult): Discharge Planning  Current Support Network: Own Home, Other(pt lives with father, whom he cares for. )  Freedom of Choice Offered: Yes   Resource Information Provided?: (ATC Dual coverage )  Discharge Location  Discharge Placement: Home

## 2019-03-20 NOTE — DISCHARGE SUMMARY
Hospitalist Discharge Summary     Patient ID:  Annmarie Oswald  [id number]  20 y.o.  1960  Admit date: 3/16/2019  7:04 PM  Discharge date and time: 3/20/2019  Attending: Gricel Clark DO  PCP:  Claudene Gemma, DO  Treatment Team: Attending Provider: Gricel Clark DO; Utilization Review: Raymundo Sanchez RN; Care Manager: Casie Galaviz RN    Principal Diagnosis Agitation   Principal Problem:    Agitation (3/17/2019)    Active Problems:    Acute encephalopathy (3/17/2019)      Opioid abuse ([facility] [address] (3/17/2019)      Dehydration (3/17/2019)      HTN (hypertension) (3/17/2019)      Non-compliance (3/17/2019)      Methadone dependence ([facility] [address] (3/17/2019)      PNA (pneumonia) (3/19/2019)       Patient is a 60years old male with history of opioid dependence, HTN and non compliance who was recently started on methadone called the ambulance today because he though he may be having a seizure. He stayed he took a white pill. Initially in ED, he was lucid but w/ some confusion and quickly turned into lethargy and severe agitation. He was given multiple and large doses of benzo, ketamine, morphine and  geodon to control his agitation. At time on interview, he is severely lethargic. Interval History (3/20): patient examined at bedside. No acute overnight events. He received a phone call this morning saying his father was on his way to the hospital via EMS due to \"coughing up blood\". Patient anxious and wants to leave. He denies fevers/chills, chest pain, shortness of breath, or abdominal pain. Still has a productive cough. Hospital Course:  Please refer to the admission H&P for details of presentation. In summary, the patient is admitted for acute metabolic encephalopathy complicated by RLL aspiration pneumonia. UDS positive for methadone and opiates. Patient given multiple sedatives in the ED and started on empiric vancomycin and Zosyn. He required Precedex gtt to control agitation.  He had interval improvement and wished to be discharged from the hospital on the day of discharge as \"my dad is coming by ambulance to the hospital and is coughing up blood. \" Patient hemodynamically stable for discharge. Will continue empiric Augmentin to complete a total of 7-day course of antibiotics for aspiration pneumonia. Return precautions provided. All questions answered. Significant Diagnostic Studies:     EXAM: Noncontrast CT head.     INDICATION: Mental status changes.     COMPARISON: None.     TECHNIQUE: Noncontrast CT images of the head were obtained. Radiation dose  reduction techniques were used for this study. Our CT scanners use one or all  of the following:  Automated exposure control, adjustment of the mA or kV  according to patient size, iterative reconstruction.     FINDINGS: Brain volume is appropriate for age. No acute infarct, hemorrhage or  mass is identified. There is no mass effect, midline shift or depressed  fracture. The visualized paranasal sinuses and mastoid air cells are clear.     IMPRESSION  IMPRESSION: No acute process. AP chest radiograph     History: monitor lung condition, 60 years Male     Comparison: None available     Findings:   Normal cardiomediastinal silhouette. Asymmetric right basilar  airspace opacity likely represents developing right lower lobar pneumonia. No  evidence of pneumothorax, pleural effusion. Visualized soft tissue and osseous  structures otherwise unremarkable.       IMPRESSION  Impression:  Probable acute right lower lobar pneumonia.     Labs: Results:       Chemistry Recent Labs     03/20/19  0434 03/19/19  0340 03/18/19  0338   GLU 77 86 106*    140 139   K 3.2* 3.6 4.6    106 106   CO2 26 27 27   BUN 15 18 25*   CREA 0.54* 0.61* 0.78*   CA 8.3 8.2* 8.1*   AGAP 9 7 6*   AP  --   --  59   TP  --   --  6.4   ALB  --   --  2.8*   GLOB  --   --  3.6*   AGRAT  --   --  0.8*      CBC w/Diff Recent Labs     03/20/19  0434 03/19/19  0340 03/18/19  0338   WBC 9.1 11.5* 16.7*   RBC 3.85* 4.14* 4.49   HGB 11.5* 12.1* 13.2*   HCT 34.8* 38.4* 42.4    205 228   GRANS 72  --   --    LYMPH 19  --   --    EOS 1  --   --       Cardiac Enzymes No results for input(s): CPK, CKND1, KEITH in the last 72 hours. No lab exists for component: CKRMB, TROIP   Coagulation No results for input(s): PTP, INR, APTT in the last 72 hours. No lab exists for component: INREXT    Lipid Panel No results found for: CHOL, CHOLPOCT, CHOLX, CHLST, CHOLV, 908655, HDL, LDL, LDLC, DLDLP, 825122, VLDLC, VLDL, TGLX, TRIGL, TRIGP, TGLPOCT, CHHD, CHHDX   BNP No results for input(s): BNPP in the last 72 hours. Liver Enzymes Recent Labs     03/18/19 0338   TP 6.4   ALB 2.8*   AP 59   SGOT 188*      Thyroid Studies No results found for: T4, T3U, TSH, TSHEXT         Discharge Exam:  Visit Vitals  /83 (BP 1 Location: Right arm, BP Patient Position: At rest)   Pulse 74   Temp 97.7 °F (36.5 °C)   Resp 18   Ht 5' 9\" (1.753 m)   Wt 70.6 kg (155 lb 10.3 oz)   SpO2 96%   BMI 22.98 kg/m²     General appearance: alert, cooperative, no distress, appears stated age  Lungs: coarse breath sounds with productive cough, not visibly tachypneic or dyspneic, talking in complete sentences with no difficulty, no audible wheezes  Heart: regular rate and rhythm, S1, S2 normal, no murmur, click, rub or gallop  Abdomen: soft, non-tender. Bowel sounds normal. No masses,  no organomegaly  Extremities: no cyanosis or edema  Neurologic: Grossly normal    Disposition: home  Discharge Condition: stable  Patient Instructions:   Current Discharge Medication List      START taking these medications    Details   amoxicillin-clavulanate (AUGMENTIN) 875-125 mg per tablet Take 1 Tab by mouth two (2) times a day for 5 days. Qty: 10 Tab, Refills: 0         CONTINUE these medications which have NOT CHANGED    Details   losartan (COZAAR) 25 mg tablet Take 1 Tab by mouth daily.   Qty: 30 Tab, Refills: 5 Associated Diagnoses: Essential hypertension      chlorthalidone (HYGROTEN) 25 mg tablet Take 1 Tab by mouth daily. Qty: 30 Tab, Refills: 5    Associated Diagnoses: Essential hypertension      lidocaine (SALONPAS/ASPERCREME) 4 % patch Apply to low back for pain relief once daily  Qty: 10 Patch, Refills: 0             Activity: Activity as tolerated  Diet: Cardiac Diet  Wound Care: None needed    Follow-up  ·   With PCP within 1 week  Time spent to discharge patient 35 minutes  Signed:   Ashkan Shoemaker DO  3/20/2019  9:40 AM

## 2019-03-20 NOTE — PROGRESS NOTES
Hourly rounds performed. All needs meet. Bed low/locked. Side rails x2. Call light within reach. Patient denies needs at this time.   Will continue to monitor and report to oncoming RN

## 2019-03-20 NOTE — PROGRESS NOTES
03/19/19 2045   Dual Skin Pressure Injury Assessment   Dual Skin Pressure Injury Assessment WDL  (scattered scabs on right shoulder, bilateral hands, feet)   Second Care Provider (Based on [address] [place] [address]) [facility]

## 2019-03-20 NOTE — PROGRESS NOTES
Pt placed on BIPAP at this time. No complications noted. 03/19/19 2314   Oxygen Therapy   O2 Sat (%) 97 %   Pulse via Oximetry 86 beats per minute   O2 Device BIPAP   FIO2 (%) 30 %   Respiratory   Respiratory (WDL) X   Respiratory Pattern Dyspnea at rest   Chest/Tracheal Assessment Chest expansion, symmetrical   Breath Sounds Bilateral Diminished   Cough Non-productive   CPAP/BIPAP   CPAP/BIPAP Start/Stop On   Device Mode BIPAP;S/T   Mask Type and Size Full face   Skin Condition intact  (dressing applied over nose to prevent skin breakdown)   PIP Observed 12 cm H20   IPAP (cm H2O) 12 cm H2O   EPAP (cm H2O) 8 cm H2O   Inspiratory Time (sec) 0.9 seconds   Vt Spont (ml) 390 ml   Ve Observed (l/min) 8 l/min   Backup Rate 10   Total RR (Spontaneous) 20 breaths per minute   Insp Rise Time (sec) 3   Leak (Estimated) 23 L/min   Pt's Home Machine No   Biomedical Check Performed Yes   Settings Verified Yes   Alarm Settings   High Pressure 30   Low Pressure 2   Apnea 20   Low Ve 2   High Rate 50   Low Rate 55   Pulmonary Toilet   Pulmonary Toilet H. O.B elevated

## 2019-03-20 NOTE — PROGRESS NOTES
Critical Care Outreach Nurse Progress Report:    Subjective: In to assess pt secondary to transfer from CCU      MEWS Score: 1 (03/19/19 1531)    Vitals:    03/19/19 1802 03/19/19 1817 03/19/19 2029 03/19/19 2045   BP: 151/86 146/75  173/89   Pulse: 70 65  76   Resp: 17 18 18   Temp:    98 °F (36.7 °C)   SpO2: 99% 99% 93% 93%   Weight:       Height:            LAB DATA:    Recent Labs     03/19/19 0340 03/18/19 0338 03/17/19  1004    139  --    K 3.6 4.6 4.2    106  --    CO2 27 27  --    AGAP 7 6*  --    GLU 86 106*  --    BUN 18 25*  --    CREA 0.61* 0.78*  --    GFRAA >60 >60  --    GFRNA >60 >60  --    CA 8.2* 8.1*  --    MG  --  2.0  --    ALB  --  2.8*  --    TP  --  6.4  --    GLOB  --  3.6*  --    AGRAT  --  0.8*  --    ALT  --  156*  --         Recent Labs     03/19/19 0340 03/18/19 0338   WBC 11.5* 16.7*   HGB 12.1* 13.2*   HCT 38.4* 42.4    228        Objective: Patient in bed resting quietly    Pain Intensity 1: 6 (03/19/19 1650)  Pain Location 1: Head  Pain Intervention(s) 1: Medication (see MAR), Relaxation technique, Repositioned, Rest  Patient Stated Pain Goal: 0    Assessment: Patient admitted with encephalopathy. Patient oriented at this time on RA sat 94%. Patient eager to go home and no complaints at this time.     Plan: Follow per Outreach protocol

## 2019-03-20 NOTE — PROGRESS NOTES
Security called to return patients money that was locked in safe. Security on floor to return items.

## 2019-03-20 NOTE — PROGRESS NOTES
Discharge instructions and prescriptions provided and explained to patient, patient voiced understanding. Medication side effect sheet reviewed with pt. No home meds or valuables to return. Opportunity for questions provided. Primary RN to remove duke. Instructed to call once ready to leave the floor.

## 2019-03-20 NOTE — PROGRESS NOTES
Date of Outreach Update:  David Pulliam was seen and assessed. MEWS Score: 1 (03/20/19 0545)  Vitals:    03/20/19 6692 03/20/19 0545 03/20/19 0712 03/20/19 0723   BP: 158/88 174/85  140/83   Pulse: 65 94  74   Resp: 18 18  18   Temp: 97.6 °F (36.4 °C) 97.6 °F (36.4 °C)  97.7 °F (36.5 °C)   SpO2: 97% 94% 95% 96%   Weight:       Height:             Pain Assessment  Pain Intensity 1: 0 (03/20/19 0545)  Pain Location 1: Head  Pain Intervention(s) 1: Medication (see MAR)  Patient Stated Pain Goal: 0      Previous Outreach assessment has been reviewed. There have been no significant clinical changes since the completion of the last dated Outreach assessment. Patient laying in bed eating breakfast. On room air with O2 sat of 94% and HR 82. Patient A&O x 4. Lung sounds clear bilaterally. Patient states he \"feels much better\" and wants to go home soon. No concerns from patient at this time. Will continue to follow up per outreach protocol.     Signed By:   Chris Stinson    March 20, 2019 8:57 AM

## 2019-03-22 LAB
BACTERIA SPEC CULT: NORMAL
BACTERIA SPEC CULT: NORMAL
SERVICE CMNT-IMP: NORMAL
SERVICE CMNT-IMP: NORMAL

## 2020-07-30 ENCOUNTER — HOSPITAL ENCOUNTER (EMERGENCY)
Age: 60
Discharge: LEFT BEFORE DISCHARGE | End: 2020-07-30
Attending: EMERGENCY MEDICINE
Payer: COMMERCIAL

## 2020-07-30 VITALS
BODY MASS INDEX: 22.35 KG/M2 | OXYGEN SATURATION: 98 % | TEMPERATURE: 98.9 F | HEIGHT: 72 IN | HEART RATE: 94 BPM | SYSTOLIC BLOOD PRESSURE: 185 MMHG | RESPIRATION RATE: 18 BRPM | WEIGHT: 165 LBS | DIASTOLIC BLOOD PRESSURE: 110 MMHG

## 2020-07-30 PROCEDURE — 75810000275 HC EMERGENCY DEPT VISIT NO LEVEL OF CARE

## 2021-08-23 ENCOUNTER — HOSPITAL ENCOUNTER (EMERGENCY)
Age: 61
Discharge: LEFT BEFORE DISCHARGE | End: 2021-08-23
Payer: MEDICARE

## 2021-08-23 PROCEDURE — 75810000275 HC EMERGENCY DEPT VISIT NO LEVEL OF CARE

## 2021-09-02 ENCOUNTER — HOSPITAL ENCOUNTER (EMERGENCY)
Age: 61
Discharge: HOME | End: 2021-09-02
Attending: EMERGENCY MEDICINE
Payer: COMMERCIAL

## 2021-09-02 ENCOUNTER — APPOINTMENT (OUTPATIENT)
Dept: GENERAL RADIOLOGY | Age: 61
End: 2021-09-02
Attending: EMERGENCY MEDICINE
Payer: COMMERCIAL

## 2021-09-02 VITALS
SYSTOLIC BLOOD PRESSURE: 173 MMHG | DIASTOLIC BLOOD PRESSURE: 93 MMHG | OXYGEN SATURATION: 98 % | HEART RATE: 60 BPM | HEIGHT: 72 IN | BODY MASS INDEX: 23.03 KG/M2 | TEMPERATURE: 97.8 F | WEIGHT: 170 LBS | RESPIRATION RATE: 18 BRPM

## 2021-09-02 DIAGNOSIS — I10 HYPERTENSION, UNSPECIFIED TYPE: Primary | ICD-10-CM

## 2021-09-02 LAB
ALBUMIN SERPL-MCNC: 3.4 G/DL (ref 3.2–4.6)
ALBUMIN/GLOB SERPL: 0.7 {RATIO} (ref 1.2–3.5)
ALP SERPL-CCNC: 75 U/L (ref 50–136)
ALT SERPL-CCNC: 37 U/L (ref 12–65)
ANION GAP SERPL CALC-SCNC: 5 MMOL/L (ref 7–16)
AST SERPL-CCNC: 25 U/L (ref 15–37)
ATRIAL RATE: 67 BPM
BASOPHILS # BLD: 0 K/UL (ref 0–0.2)
BASOPHILS NFR BLD: 0 % (ref 0–2)
BILIRUB SERPL-MCNC: 0.4 MG/DL (ref 0.2–1.1)
BUN SERPL-MCNC: 11 MG/DL (ref 8–23)
CALCIUM SERPL-MCNC: 9.8 MG/DL (ref 8.3–10.4)
CALCULATED P AXIS, ECG09: 75 DEGREES
CALCULATED R AXIS, ECG10: 51 DEGREES
CALCULATED T AXIS, ECG11: 12 DEGREES
CHLORIDE SERPL-SCNC: 106 MMOL/L (ref 98–107)
CO2 SERPL-SCNC: 30 MMOL/L (ref 21–32)
CREAT SERPL-MCNC: 0.73 MG/DL (ref 0.8–1.5)
DIAGNOSIS, 93000: NORMAL
DIFFERENTIAL METHOD BLD: ABNORMAL
EOSINOPHIL # BLD: 0.1 K/UL (ref 0–0.8)
EOSINOPHIL NFR BLD: 1 % (ref 0.5–7.8)
ERYTHROCYTE [DISTWIDTH] IN BLOOD BY AUTOMATED COUNT: 13 % (ref 11.9–14.6)
GLOBULIN SER CALC-MCNC: 4.8 G/DL (ref 2.3–3.5)
GLUCOSE SERPL-MCNC: 116 MG/DL (ref 65–100)
HCT VFR BLD AUTO: 40.8 % (ref 41.1–50.3)
HGB BLD-MCNC: 13.3 G/DL (ref 13.6–17.2)
IMM GRANULOCYTES # BLD AUTO: 0.1 K/UL (ref 0–0.5)
IMM GRANULOCYTES NFR BLD AUTO: 1 % (ref 0–5)
LYMPHOCYTES # BLD: 3.3 K/UL (ref 0.5–4.6)
LYMPHOCYTES NFR BLD: 36 % (ref 13–44)
MAGNESIUM SERPL-MCNC: 2.1 MG/DL (ref 1.8–2.4)
MCH RBC QN AUTO: 28.9 PG (ref 26.1–32.9)
MCHC RBC AUTO-ENTMCNC: 32.6 G/DL (ref 31.4–35)
MCV RBC AUTO: 88.5 FL (ref 79.6–97.8)
MONOCYTES # BLD: 1 K/UL (ref 0.1–1.3)
MONOCYTES NFR BLD: 11 % (ref 4–12)
NEUTS SEG # BLD: 4.6 K/UL (ref 1.7–8.2)
NEUTS SEG NFR BLD: 50 % (ref 43–78)
NRBC # BLD: 0 K/UL (ref 0–0.2)
P-R INTERVAL, ECG05: 132 MS
PLATELET # BLD AUTO: 369 K/UL (ref 150–450)
PMV BLD AUTO: 9.3 FL (ref 9.4–12.3)
POTASSIUM SERPL-SCNC: 4 MMOL/L (ref 3.5–5.1)
PROT SERPL-MCNC: 8.2 G/DL (ref 6.3–8.2)
Q-T INTERVAL, ECG07: 388 MS
QRS DURATION, ECG06: 92 MS
QTC CALCULATION (BEZET), ECG08: 409 MS
RBC # BLD AUTO: 4.61 M/UL (ref 4.23–5.6)
SODIUM SERPL-SCNC: 141 MMOL/L (ref 136–145)
VENTRICULAR RATE, ECG03: 67 BPM
WBC # BLD AUTO: 9.2 K/UL (ref 4.3–11.1)

## 2021-09-02 PROCEDURE — 85025 COMPLETE CBC W/AUTO DIFF WBC: CPT

## 2021-09-02 PROCEDURE — 74011250637 HC RX REV CODE- 250/637: Performed by: EMERGENCY MEDICINE

## 2021-09-02 PROCEDURE — 80053 COMPREHEN METABOLIC PANEL: CPT

## 2021-09-02 PROCEDURE — 71046 X-RAY EXAM CHEST 2 VIEWS: CPT

## 2021-09-02 PROCEDURE — 99284 EMERGENCY DEPT VISIT MOD MDM: CPT

## 2021-09-02 PROCEDURE — 83735 ASSAY OF MAGNESIUM: CPT

## 2021-09-02 PROCEDURE — 93005 ELECTROCARDIOGRAM TRACING: CPT | Performed by: EMERGENCY MEDICINE

## 2021-09-02 RX ORDER — SODIUM CHLORIDE 0.9 % (FLUSH) 0.9 %
5-10 SYRINGE (ML) INJECTION EVERY 8 HOURS
Status: DISCONTINUED | OUTPATIENT
Start: 2021-09-02 | End: 2021-09-02 | Stop reason: HOSPADM

## 2021-09-02 RX ORDER — SODIUM CHLORIDE 0.9 % (FLUSH) 0.9 %
5-10 SYRINGE (ML) INJECTION AS NEEDED
Status: DISCONTINUED | OUTPATIENT
Start: 2021-09-02 | End: 2021-09-02 | Stop reason: HOSPADM

## 2021-09-02 RX ORDER — AMLODIPINE BESYLATE 10 MG/1
10 TABLET ORAL DAILY
Qty: 30 TABLET | Refills: 6 | Status: SHIPPED | OUTPATIENT
Start: 2021-09-02 | End: 2021-10-02

## 2021-09-02 RX ORDER — CLONIDINE HYDROCHLORIDE 0.1 MG/1
0.2 TABLET ORAL
Status: COMPLETED | OUTPATIENT
Start: 2021-09-02 | End: 2021-09-02

## 2021-09-02 RX ADMIN — CLONIDINE HYDROCHLORIDE 0.2 MG: 0.1 TABLET ORAL at 07:25

## 2021-09-02 NOTE — DISCHARGE INSTRUCTIONS
Follow up with one of the doctors listed. Return to the ER if your symptoms worsen.     [address] [facility] [address]  [phone]    [facility]  [phone]

## 2021-09-02 NOTE — ED TRIAGE NOTES
Patient states headache, ringing in ears. States blood pressure 197/116 at home.  States that he does not take blood pressure medications but did take them in the past.

## 2021-09-02 NOTE — ED NOTES
I have reviewed discharge instructions with the patient. The patient verbalized understanding. Patient left ED via Discharge Method: ambulatory to Home with (self  Opportunity for questions and clarification provided. Patient given 1 scripts. No esign      To continue your aftercare when you leave the hospital, you may receive an automated call from our care team to check in on how you are doing. This is a free service and part of our promise to provide the best care and service to meet your aftercare needs.  If you have questions, or wish to unsubscribe from this service please call [phone]. Thank you for Choosing our [facility] Emergency Department.

## 2021-09-02 NOTE — ED PROVIDER NOTES
Patient has a history of hypertension on medication but he says he has been on medication in the past who presents with markedly elevated blood pressures. He states for the past week his blood pressures have been elevated. He gets up to 225/115. He denies any chest pain or shortness of breath. He denies any aggravating or alleviating factors. He got concerned about his persistently elevated blood pressure and came here for evaluation. Past Medical History:   Diagnosis Date    Acute encephalopathy 3/17/2019    HTN (hypertension) 3/17/2019    Other ill-defined conditions(799.89)     back problems       Past Surgical History:   Procedure Laterality Date    HX APPENDECTOMY           No family history on file. Social History     Socioeconomic History    Marital status:      Spouse name: Not on file    Number of children: Not on file    Years of education: Not on file    Highest education level: Not on file   Occupational History    Not on file   Tobacco Use    Smoking status: Current Every Day Smoker     Packs/day: 0.50    Smokeless tobacco: Never Used   Substance and Sexual Activity    Alcohol use: No    Drug use: No    Sexual activity: Not Currently   Other Topics Concern    Not on file   Social History Narrative    Not on file     Social Determinants of Health     Financial Resource Strain:     Difficulty of Paying Living Expenses:    Food Insecurity:     Worried About Running Out of Food in the Last Year:     [address] in the Last Year:    Transportation Needs:     Lack of Transportation (Medical):      Lack of Transportation (Non-Medical):    Physical Activity:     Days of Exercise per Week:     Minutes of Exercise per Session:    Stress:     Feeling of Stress :    Social Connections:     Frequency of Communication with Friends and Family:     Frequency of Social Gatherings with Friends and Family:     Attends Shinto Services:     Active Member of Clubs or Organizations:     Attends Club or Organization Meetings:     Marital Status:    Intimate Partner Violence:     Fear of Current or Ex-Partner:     Emotionally Abused:     Physically Abused:     Sexually Abused: ALLERGIES: Patient has no known allergies. Review of Systems   Constitutional: Negative for chills and fever. Gastrointestinal: Negative for nausea and vomiting. All other systems reviewed and are negative. Vitals:    09/02/21 0403 09/02/21 0639   BP: (!) 193/109 (!) 193/97   Pulse: 75 62   Resp: 18    Temp: 97.8 °F (36.6 °C)    SpO2: 97% 98%   Weight: 77.1 kg (170 lb)    Height: 6' (1.829 m)             Physical Exam  Vitals and nursing note reviewed. Constitutional:       Appearance: Normal appearance. He is well-developed. HENT:      Head: Normocephalic and atraumatic. Eyes:      Conjunctiva/sclera: Conjunctivae normal.      Pupils: Pupils are equal, round, and reactive to light. Cardiovascular:      Rate and Rhythm: Normal rate and regular rhythm. Heart sounds: No murmur heard. Pulmonary:      Effort: Pulmonary effort is normal. No respiratory distress. Breath sounds: No wheezing. Musculoskeletal:         General: Normal range of motion. Cervical back: Normal range of motion and neck supple. Right lower leg: No edema. Left lower leg: No edema. Skin:     General: Skin is warm and dry. Neurological:      Mental Status: He is alert and oriented to person, place, and time. MDM  Number of Diagnoses or Management Options  Hypertension, unspecified type: new and requires workup  Diagnosis management comments: 7:23 AM discussed results with patient, unremarkable lab work. His last blood pressure is 193/97. We will give Catapres 0.2 mg and reevaluate.        Amount and/or Complexity of Data Reviewed  Clinical lab tests: ordered and reviewed  Tests in the medicine section of CPT®: ordered and reviewed    Risk of Complications, Morbidity, and/or Mortality  Presenting problems: moderate  Diagnostic procedures: moderate  Management options: moderate    Patient Progress  Patient progress: improved         EKG    Date/Time: 9/2/2021 7:24 AM  Performed by: Barbie Rocha MD  Authorized by: Barbie Rocha MD     ECG reviewed by ED Physician in the absence of a cardiologist: yes    Previous ECG:     Previous ECG:  Unavailable  Interpretation:     Interpretation: normal    Rate:     ECG rate:  73    ECG rate assessment: normal    Rhythm:     Rhythm: sinus rhythm    Ectopy:     Ectopy: none    QRS:     QRS intervals:  Normal  Conduction:     Conduction: normal    ST segments:     ST segments:  Normal  T waves:     T waves: normal    Other findings:     Other findings: LVH

## 2022-03-18 PROBLEM — I10 HTN (HYPERTENSION): Status: ACTIVE | Noted: 2019-03-17

## 2022-03-19 PROBLEM — J18.9 PNA (PNEUMONIA): Status: ACTIVE | Noted: 2019-03-19

## 2022-03-19 PROBLEM — G93.40 ACUTE ENCEPHALOPATHY: Status: ACTIVE | Noted: 2019-03-17

## 2022-03-19 PROBLEM — E86.0 DEHYDRATION: Status: ACTIVE | Noted: 2019-03-17

## 2022-03-19 PROBLEM — R45.1 AGITATION: Status: ACTIVE | Noted: 2019-03-17

## 2022-03-19 PROBLEM — F11.20 METHADONE DEPENDENCE (HCC): Status: ACTIVE | Noted: 2019-03-17

## 2022-03-19 PROBLEM — F11.10 OPIOID ABUSE (HCC): Status: ACTIVE | Noted: 2019-03-17

## 2022-03-20 PROBLEM — Z91.199 NON-COMPLIANCE: Status: ACTIVE | Noted: 2019-03-17

## 2022-09-30 ENCOUNTER — APPOINTMENT (OUTPATIENT)
Dept: GENERAL RADIOLOGY | Age: 62
End: 2022-09-30
Payer: COMMERCIAL

## 2022-09-30 ENCOUNTER — HOSPITAL ENCOUNTER (EMERGENCY)
Age: 62
Discharge: HOME | End: 2022-09-30
Attending: EMERGENCY MEDICINE
Payer: COMMERCIAL

## 2022-09-30 VITALS
HEART RATE: 68 BPM | BODY MASS INDEX: 23.03 KG/M2 | TEMPERATURE: 97.4 F | HEIGHT: 72 IN | OXYGEN SATURATION: 98 % | WEIGHT: 170 LBS | RESPIRATION RATE: 16 BRPM | SYSTOLIC BLOOD PRESSURE: 162 MMHG | DIASTOLIC BLOOD PRESSURE: 88 MMHG

## 2022-09-30 DIAGNOSIS — H61.23 BILATERAL IMPACTED CERUMEN: ICD-10-CM

## 2022-09-30 DIAGNOSIS — I10 UNCONTROLLED HYPERTENSION: Primary | ICD-10-CM

## 2022-09-30 DIAGNOSIS — Z76.0 ENCOUNTER FOR MEDICATION REFILL: ICD-10-CM

## 2022-09-30 LAB
ALBUMIN SERPL-MCNC: 4.2 G/DL (ref 3.2–4.6)
ALBUMIN/GLOB SERPL: 1.1 {RATIO} (ref 1.2–3.5)
ALP SERPL-CCNC: 66 U/L (ref 50–136)
ALT SERPL-CCNC: 33 U/L (ref 12–65)
ANION GAP SERPL CALC-SCNC: 1 MMOL/L (ref 4–13)
AST SERPL-CCNC: 24 U/L (ref 15–37)
BILIRUB SERPL-MCNC: 0.5 MG/DL (ref 0.2–1.1)
BUN SERPL-MCNC: 19 MG/DL (ref 8–23)
CALCIUM SERPL-MCNC: 9.4 MG/DL (ref 8.3–10.4)
CHLORIDE SERPL-SCNC: 104 MMOL/L (ref 101–110)
CO2 SERPL-SCNC: 30 MMOL/L (ref 21–32)
CREAT SERPL-MCNC: 0.8 MG/DL (ref 0.8–1.5)
EKG ATRIAL RATE: 69 BPM
EKG DIAGNOSIS: NORMAL
EKG P AXIS: 51 DEGREES
EKG P-R INTERVAL: 143 MS
EKG Q-T INTERVAL: 378 MS
EKG QRS DURATION: 99 MS
EKG QTC CALCULATION (BAZETT): 405 MS
EKG R AXIS: 63 DEGREES
EKG T AXIS: 17 DEGREES
EKG VENTRICULAR RATE: 69 BPM
ERYTHROCYTE [DISTWIDTH] IN BLOOD BY AUTOMATED COUNT: 13.8 % (ref 11.9–14.6)
GLOBULIN SER CALC-MCNC: 3.8 G/DL (ref 2.3–3.5)
GLUCOSE SERPL-MCNC: 80 MG/DL (ref 65–100)
HCT VFR BLD AUTO: 42.1 % (ref 41.1–50.3)
HGB BLD-MCNC: 14 G/DL (ref 13.6–17.2)
MCH RBC QN AUTO: 31.1 PG (ref 26.1–32.9)
MCHC RBC AUTO-ENTMCNC: 33.3 G/DL (ref 31.4–35)
MCV RBC AUTO: 93.6 FL (ref 79.6–97.8)
NRBC # BLD: 0 K/UL (ref 0–0.2)
PLATELET # BLD AUTO: 262 K/UL (ref 150–450)
PMV BLD AUTO: 9.8 FL (ref 9.4–12.3)
POTASSIUM SERPL-SCNC: 3.6 MMOL/L (ref 3.5–5.1)
PROT SERPL-MCNC: 8 G/DL (ref 6.3–8.2)
RBC # BLD AUTO: 4.5 M/UL (ref 4.23–5.6)
SODIUM SERPL-SCNC: 135 MMOL/L (ref 138–145)
WBC # BLD AUTO: 8.5 K/UL (ref 4.3–11.1)

## 2022-09-30 PROCEDURE — 94761 N-INVAS EAR/PLS OXIMETRY MLT: CPT

## 2022-09-30 PROCEDURE — 99285 EMERGENCY DEPT VISIT HI MDM: CPT

## 2022-09-30 PROCEDURE — 96374 THER/PROPH/DIAG INJ IV PUSH: CPT

## 2022-09-30 PROCEDURE — 80053 COMPREHEN METABOLIC PANEL: CPT

## 2022-09-30 PROCEDURE — 85027 COMPLETE CBC AUTOMATED: CPT

## 2022-09-30 PROCEDURE — 6360000002 HC RX W HCPCS: Performed by: EMERGENCY MEDICINE

## 2022-09-30 PROCEDURE — 93005 ELECTROCARDIOGRAM TRACING: CPT | Performed by: EMERGENCY MEDICINE

## 2022-09-30 PROCEDURE — 71045 X-RAY EXAM CHEST 1 VIEW: CPT

## 2022-09-30 RX ORDER — HYDRALAZINE HYDROCHLORIDE 20 MG/ML
5 INJECTION INTRAMUSCULAR; INTRAVENOUS ONCE
Status: COMPLETED | OUTPATIENT
Start: 2022-09-30 | End: 2022-09-30

## 2022-09-30 RX ORDER — LOSARTAN POTASSIUM 25 MG/1
25 TABLET ORAL DAILY
Qty: 30 TABLET | Refills: 1 | Status: SHIPPED | OUTPATIENT
Start: 2022-09-30 | End: 2022-10-30

## 2022-09-30 RX ADMIN — HYDRALAZINE HYDROCHLORIDE 5 MG: 20 INJECTION, SOLUTION INTRAMUSCULAR; INTRAVENOUS at 08:45

## 2022-09-30 ASSESSMENT — ENCOUNTER SYMPTOMS
COUGH: 0
PHOTOPHOBIA: 0
ABDOMINAL PAIN: 0
NAUSEA: 0
SHORTNESS OF BREATH: 0
RHINORRHEA: 0
VOMITING: 0

## 2022-09-30 ASSESSMENT — PAIN SCALES - GENERAL: PAINLEVEL_OUTOF10: 0

## 2022-09-30 ASSESSMENT — PAIN - FUNCTIONAL ASSESSMENT: PAIN_FUNCTIONAL_ASSESSMENT: 0-10

## 2022-09-30 NOTE — ED NOTES
Patient did not want his ears irrigated. Patient states he will get something over the counter to do it.       Ernesto Edwards RN  09/30/22 0716

## 2022-09-30 NOTE — ED TRIAGE NOTES
Patient arrived ambulatory to room stating that his bp yesterday was 209/111. Patient denies chest pain. Patient is having ringing in his left ear. Denies blurry vision. Patient is not on bp medication. Bp in room is 184/126.

## 2022-09-30 NOTE — ED NOTES
Annual physical    Duncan Aflaro is a 55 year old male presenting for comprehensive annual physical today.  He reports he is doing well and denies any new complaints.  A prominent discomfort he had his right inner thigh resolved following his MRI.      ROS:    CONSTITUTIONAL: Denies unintentional weight change, fatigue, fever, problematic headaches.   EYES:  Denies visual blurring, double vision.   ENT: Denies nose bleeds (epistaxis), trouble swallowing, hoarseness and chronic sinus or nasal problems, dysphagia.   CV:  Denies chest discomfort with exertion, SOB, MAHER, orthopnea, palpitations.  RESPIRATORY: Denies sputum, hemoptysis and cough, SOB, change in  exercise tolerance.   GI:  Denies abdominal pain, nausea, change in  bowel habits, bright red bleeding per rectum, hematochezia, melena.   : Denies dysuria and frequency.   MSK: Denies joint pain,muscle aches.   SKIN:  Denies concerns, changing moles.   NEURO:  Denies headaches or weakness or clumsiness of one limb, part of a limb or side of body, transient receptive or expressive aphasia, numbness or tingling.   PSYCH: Denies anxiety, denies depression and suicide ideation or harm intentions.  ENDOCRINE:  Denies cold intolerance, heat intolerance, polyphagia, polydipsia, polyuria.   HEME/LYMPH:  Denies abnormal bruising or bleeding, lumps or bumps.   ALLERGY/IMMUN: Denies chronic sinus problems, chronic nasal problems.     Past Medical History:   Diagnosis Date   • Allergic Rhinitis    • Coronary atherosclerosis of unspecified type of vessel, native or graft     60% stenosis RT coronary   • DEGENERATED DISK DISEASE LUMBOSACRAL-NO MYELOPATHY    • Essential (primary) hypertension    • Hyperlipidemia    • NODULES       (SEE ALSO MASS) LUNG     1 nodule not otherwise specified   • Unspecified asthma, with status asthmaticus      Past Surgical History:   Procedure Laterality Date   • Colonoscopy w biopsy  2/22/2010, 10/5/2015    3 polyps 2/22/2010,  I have reviewed discharge instructions with the patient. The patient verbalized understanding. Patient left ED via Discharge Method: ambulatory to Home with self. Opportunity for questions and clarification provided. Patient given 1 scripts. To continue your aftercare when you leave the hospital, you may receive an automated call from our care team to check in on how you are doing. This is a free service and part of our promise to provide the best care and service to meet your aftercare needs.  If you have questions, or wish to unsubscribe from this service please call [phone]. Thank you for Choosing our [facility] Emergency Department.            Zack Almodovar RN  09/30/22 [place], RN  09/30/22 4807 Diverticulosis   • Esophagogastroduodenoscopy  8-2016    H pylori   • Lumbar spine fusion,anter apprch  04/02/2007    L5-S1   • Myocardl perf rest stress  07/07/2006    Myocardial Perfusion   • Prostate specific antigen screening  12/10/2007    1.2   • Removal of sperm duct(s)  12/12/2006   • Right heart catheterization  07/01/2006, 2/27/2012    Cardiac Cath, Right     Current Outpatient Medications   Medication   • amLODIPine (NORVASC) 5 MG tablet   • rosuvastatin (CRESTOR) 10 MG tablet   • albuterol 108 (90 Base) MCG/ACT inhaler   • omeprazole (PRILOSEC) 20 MG capsule   • fish oil-omega-3 fatty acids 1000 MG CAPS     No current facility-administered medications for this visit.      ALLERGIES:  Patient has no known allergies.  History reviewed. No pertinent family history.  Social History     Socioeconomic History   • Marital status: /Civil Union     Spouse name: Geraldine   • Number of children: Not on file   • Years of education: Not on file   • Highest education level: Not on file   Occupational History   • Not on file   Social Needs   • Financial resource strain: Not on file   • Food insecurity     Worry: Not on file     Inability: Not on file   • Transportation needs     Medical: Not on file     Non-medical: Not on file   Tobacco Use   • Smoking status: Never Smoker   • Smokeless tobacco: Never Used   Substance and Sexual Activity   • Alcohol use: No   • Drug use: No   • Sexual activity: Not on file   Lifestyle   • Physical activity     Days per week: Not on file     Minutes per session: Not on file   • Stress: Not on file   Relationships   • Social connections     Talks on phone: Not on file     Gets together: Not on file     Attends Faith service: Not on file     Active member of club or organization: Not on file     Attends meetings of clubs or organizations: Not on file     Relationship status: Not on file   • Intimate partner violence     Fear of current or ex partner: Not on file     Emotionally  abused: Not on file     Physically abused: Not on file     Forced sexual activity: Not on file   Other Topics Concern   • Not on file   Social History Narrative   • Not on file       Blood pressure 124/62, pulse 82, height 5' 6.34\" (1.685 m), weight 88.5 kg, SpO2 97 %.    GEN:  Pt appears stated age, is in no apparent distress and is well developed and well nourished.  SKIN:  Skin color, texture, turgor are normal. There are no bruises, rashes or lesions.  HEAD:  Normocephalic. No masses, lesions, tenderness or abnormalities.  EYES:  Normal, PERRLA, EOM's intact, sclerae anicteric and conjunctivae not injected.  EARS:  External ears normal to inspection and palpation, turgor normal, canals clear, TM's clear, normal light reflex, able to hear normal conversation.  NOSE:  Nose shows no deformity, asymmetry, or inflammation, no sinus tenderness.  MOUTH:  Lips, mucosa, and tongue normal. Teeth and gums normal.  Oropharynx clear. Mucous membranes moist.  NECK:  Supple, no adenopathy, no thyromegaly.  Thyroid normal size, nontender,  without nodularity, carotid pulses 2+ equal and no carotid bruits.  LUNGS: No respiratory distress, chest symmetric with normal A/P diameter, no chest deformities noted, normal respiratory rate and rhythm, lungs clear to auscultation.  HEART: Regular rate and rhythm, S1 normal, S2 normal, no S3, S4.  ABDOMEN: Benign, Soft, nontender, no masses, organomegaly, not distended. Bowel sounds normoactive.  RECTAL: Good tone, no fistula noted. Prostate smooth and nontender, no nodules.  Prostate 1+ enlarged  EXTREMITIES: Normal, no cyanosis, clubbing and no edema.  NEURO:  CN II-XII intact. Sensory and motor grossly intact. Reflexes normal and symmetric.    ASSESSMENT: Annual physical exam  (primary encounter diagnosis)  Comment:  Screening lab was performed after the patient had left the office.  Results are now available for review.  Chemistry panel with normal creatinine and BUN.  Liver function  test appear to be normal.  CBC with evidence of anemia with hemoglobin 11.5 hematocrit 37.5.  Patient has microcytic indices.  Urinalysis with negative microscopic and chemistry exam.  Lipid profile with triglycerides 167 HDL 43 LDL 55 total cholesterol 131  Plan: CBC WITH DIFFERENTIAL, COMPREHENSIVE METABOLIC         PANEL, LIPID PANEL WITH REFLEX, PSA, URINALYSIS        & REFLEX MICROSCOPY WITH CULTURE IF INDICATED,         THYROID STIMULATING HORMONE REFLEX       .  Patient needs to continue to follow heart healthy diet.    Anemia, unspecified type  Comment:  Anemia discovered on blood work after his visit.  Additional studies were ordered.  These are notable for evidence of iron deficiency with an iron level of 27 and ferritin level of 6. B12 and folate were normal.    Plan: FOLATE, FERRITIN, IRON AND TOTAL IRON BINDING         CAPACITY, VITAMIN B12    Patient will be contacted regarding iron deficiency anemia.  There was no report of blood loss.  Further information will be obtained    Abnormal PSA.  PSA 6.28 up from 2.84.  Will evaluate further.        General health issues discussed with patient.  Avoidance of smoking, alcohol, drugs, seat belts etc. Diet and exercise encouraged.  All questions answered, patient advised to call the office with any future concerns.

## 2022-09-30 NOTE — ED NOTES
Spoke to Delio in lab about labs not being in process.         Shahid Carter RN  09/30/22 1672 no indicators present

## 2022-09-30 NOTE — ED PROVIDER NOTES
Emergency Department Provider Note                   PCP:                None Provider               Age: 60 y.o. Sex: male       ICD-10-CM    1. Uncontrolled hypertension  I10       2. Encounter for medication refill  Z76.0           DISPOSITION Decision To Discharge 09/30/2022 08:58:37 AM        MDM  Number of Diagnoses or Management Options  Bilateral impacted cerumen  Encounter for medication refill: new, needed workup  Uncontrolled hypertension: new, needed workup  Diagnosis management comments: Hypertensive. Patient given hydralazine 5 mg IV. Patient with bilateral cerumen impaction. Discussed irrigating ears in order to remove cerumen. Patient declines. Also, discussed imaging with patient. Patient declines. Basic labs unremarkable. Patient requesting refill of his losartan. Patient states that he has follow-up appointment with PCP in November. Patient alert and oriented x3. No focal deficits. Normal gait. Given strict return precautions. States that he was most recently only taking losartan 25 mg daily. Rx given w/ 1 refill.        Amount and/or Complexity of Data Reviewed  Clinical lab tests: ordered and reviewed  Tests in the medicine section of CPT®: ordered and reviewed  Review and summarize past medical records: yes  Independent visualization of images, tracings, or specimens: yes    Risk of Complications, Morbidity, and/or Mortality  Presenting problems: moderate  Diagnostic procedures: moderate  Management options: moderate  General comments: Results Include:    Recent Results (from the past 24 hour(s))  -EKG 12 Lead:   Collection Time: 09/30/22  7:51 AM       Result                      Value             Ref Range           Ventricular Rate            69                BPM                 Atrial Rate                 69                BPM                 P-R Interval                143               ms                  QRS Duration                99                ms Q-T Interval                378               ms                  QTc Calculation (Bazet*     405               ms                  P Axis                      51                degrees             R Axis                      63                degrees             T Axis                      17                degrees             Diagnosis                   Sinus rhythm                     -CBC:   Collection Time: 09/30/22  7:54 AM       Result                      Value             Ref Range           WBC                         8.5               4.3 - 11.1 K*       RBC                         4.50              4.23 - 5.6 M*       Hemoglobin                  14.0              13.6 - 17.2 *       Hematocrit                  42.1              41.1 - 50.3 %       MCV                         93.6              79.6 - 97.8 *       MCH                         31.1              26.1 - 32.9 *       MCHC                        33.3              31.4 - 35.0 *       RDW                         13.8              11.9 - 14.6 %       Platelets                   262               150 - 450 K/*       MPV                         9.8               9.4 - 12.3 FL       nRBC                        0.00              0.0 - 0.2 K/*  -Comprehensive Metabolic Panel:   Collection Time: 09/30/22  7:54 AM       Result                      Value             Ref Range           Sodium                      135 (L)           138 - 145 mm*       Potassium                   3.6               3.5 - 5.1 mm*       Chloride                    104               101 - 110 mm*       CO2                         30                21 - 32 mmol*       Anion Gap                   1 (L)             4 - 13 mmol/L       Glucose                     80                65 - 100 mg/*       BUN                         19                8 - 23 MG/DL        Creatinine                  0.80              0.8 - 1.5 MG*       GFR         >60               >60 ml/min/1*       GFR Non- Americ*     >60               >60 ml/min/1*       Calcium                     9.4               8.3 - 10.4 M*       Total Bilirubin             0.5               0.2 - 1.1 MG*       ALT                         33                12 - 65 U/L         AST                         24                15 - 37 U/L         Alk Phosphatase             66                50 - 136 U/L        Total Protein               8.0               6.3 - 8.2 g/*       Albumin                     4.2               3.2 - 4.6 g/*       Globulin                    3.8 (H)           2.3 - 3.5 g/*       Albumin/Globulin Ratio      1.1 (L)           1.2 - 3.5            Patient Progress  Patient progress: stable       ED Course as of 09/30/22 1714   Fri Sep 30, 2022   0851 Portable Chest X-ray FINDINGS: Single AP view of the chest compared to a similar exam dated 9/2/2021  show the lungs to be expanded and clear. No pleural effusion or pneumothorax. The cardiac silhouette and mediastinum are unremarkable. The bones are normal.     IMPRESSION: No acute abnormality. [DF]      ED Course User Index  [DF] Davon Agustin MD        Orders Placed This Encounter   Procedures    XR CHEST PORTABLE    CBC    Comprehensive Metabolic Panel    Cardiac Monitor    Pulse Oximetry    Misc nursing order (specify)    EKG 12 Lead    Saline lock IV        Medications   hydrALAZINE (APRESOLINE) injection 5 mg (5 mg IntraVENous Given 9/30/22 0845)       Current Discharge Medication List           Letty Ureña is a 60 y.o. male who presents to the Emergency Department with chief complaint of elevated BP. Chief Complaint   Patient presents with    Hypertension      76-year-old male with history of medication noncompliance, hypertension, tobacco use presents with complaint of elevated blood pressure. States that he checked his blood pressure on yesterday and it was 209/111.   Patient denies chest pain, shortness of breath, nausea, vomiting, blurred vision, focal weakness, numbness, tingling. Rates symptoms as mild. Patient states he has occasional ringing in his left ear. Patient states that he has not cleaned his ears out in some time. Patient denies difficulty walking. States that he continues smoke cigarettes daily. Denies illicit drug use. States that he has been out of his blood pressure medication for around 4 months. States that he has appointment with [facility] but is not until November and he cannot wait to be seen until then. Denies any alleviating or exacerbating factors. Denies alcohol or illicit drug use. Denies any recent trauma or injury. Denies any drainage or pain to left ear. The history is provided by the patient. No  was used. Review of Systems   Constitutional:  Negative for chills, diaphoresis, fatigue and fever. HENT:  Negative for congestion and rhinorrhea. Eyes:  Negative for photophobia and visual disturbance. Respiratory:  Negative for cough and shortness of breath. Cardiovascular:  Negative for chest pain, palpitations and leg swelling. Gastrointestinal:  Negative for abdominal pain, nausea and vomiting. Genitourinary:  Negative for dysuria and flank pain. Musculoskeletal:  Negative for gait problem, neck pain and neck stiffness. Skin:  Negative for rash. Neurological:  Negative for dizziness, seizures, syncope, facial asymmetry, weakness, light-headedness, numbness and headaches. Psychiatric/Behavioral:  Negative for confusion. Past Medical History:   Diagnosis Date    Acute encephalopathy 3/17/2019    HTN (hypertension) 3/17/2019    Other ill-defined conditions(799.89)     back problems        Past Surgical History:   Procedure Laterality Date    APPENDECTOMY          No family history on file.      Social History     Socioeconomic History    Marital status:    Tobacco Use    Smoking status: Every Day     Packs/day: 0.50     Types: Cigarettes    Smokeless tobacco: Never   Substance and Sexual Activity    Alcohol use: No    Drug use: No         Patient has no known allergies. Current Discharge Medication List        CONTINUE these medications which have NOT CHANGED    Details   chlorthalidone (HYGROTON) 25 MG tablet Take 25 mg by mouth daily      lidocaine 4 % external patch Apply to low back for pain relief once daily              Vitals signs and nursing note reviewed. Patient Vitals for the past 4 hrs:   Temp Pulse Resp BP SpO2   09/30/22 0857 -- 62 17 (!) 166/91 97 %   09/30/22 0845 -- -- -- (!) 172/106 --   09/30/22 0754 -- 70 13 (!) 179/97 98 %   09/30/22 0752 -- -- -- -- 98 %   09/30/22 0748 97.4 °F (36.3 °C) 72 18 (!) 184/126 --          Physical Exam  Vitals and nursing note reviewed. Constitutional:       Appearance: Normal appearance. HENT:      Head: Normocephalic. Right Ear: There is impacted cerumen. Left Ear: There is impacted cerumen. Nose: Nose normal.      Mouth/Throat:      Mouth: Mucous membranes are moist.      Pharynx: No oropharyngeal exudate or posterior oropharyngeal erythema. Eyes:      Extraocular Movements: Extraocular movements intact. Pupils: Pupils are equal, round, and reactive to light. Cardiovascular:      Rate and Rhythm: Normal rate. Pulses: Normal pulses. Pulmonary:      Effort: Pulmonary effort is normal.      Breath sounds: Normal breath sounds. Abdominal:      General: Bowel sounds are normal. There is no distension. Palpations: Abdomen is soft. Tenderness: There is no abdominal tenderness. There is no guarding or rebound. Musculoskeletal:         General: No deformity. Normal range of motion. Cervical back: Normal range of motion. No rigidity. Skin:     General: Skin is warm. Findings: No rash. Neurological:      General: No focal deficit present. Mental Status: He is alert and oriented to person, place, and time.       Cranial Nerves: No cranial nerve deficit. Sensory: No sensory deficit. Motor: No weakness. Comments: Strength 5 out of 5 throughout. Normal sensory exam.  No facial droop. No dysarthria. No drift. Psychiatric:         Mood and Affect: Mood normal.         Behavior: Behavior normal.        EKG 12 Lead    Date/Time: 9/30/2022 8:23 AM  Performed by: Raj Borrero MD  Authorized by: Raj Borrero MD     ECG reviewed by ED Physician in the absence of a cardiologist: yes    Previous ECG:     Previous ECG:  Compared to current    Comparison ECG info:  No change from ECG on 9/2/2021  Rate:     ECG rate:  69    ECG rate assessment: normal    Rhythm:     Rhythm: sinus rhythm    Ectopy:     Ectopy: none    QRS:     QRS axis:  Normal    QRS intervals:  Normal    QRS conduction: normal    ST segments:     ST segments:  Non-specific  Other findings:     Other findings: LVH      Results for orders placed or performed during the hospital encounter of 09/30/22   XR CHEST PORTABLE    Narrative    Portable chest x-ray    CLINICAL INDICATION: Hypertension    FINDINGS: Single AP view of the chest compared to a similar exam dated 9/2/2021  show the lungs to be expanded and clear. No pleural effusion or pneumothorax. The cardiac silhouette and mediastinum are unremarkable. The bones are normal.      Impression    No acute abnormality.    CBC   Result Value Ref Range    WBC 8.5 4.3 - 11.1 K/uL    RBC 4.50 4.23 - 5.6 M/uL    Hemoglobin 14.0 13.6 - 17.2 g/dL    Hematocrit 42.1 41.1 - 50.3 %    MCV 93.6 79.6 - 97.8 FL    MCH 31.1 26.1 - 32.9 PG    MCHC 33.3 31.4 - 35.0 g/dL    RDW 13.8 11.9 - 14.6 %    Platelets 941 269 - 030 K/uL    MPV 9.8 9.4 - 12.3 FL    nRBC 0.00 0.0 - 0.2 K/uL   Comprehensive Metabolic Panel   Result Value Ref Range    Sodium 135 (L) 138 - 145 mmol/L    Potassium 3.6 3.5 - 5.1 mmol/L    Chloride 104 101 - 110 mmol/L    CO2 30 21 - 32 mmol/L    Anion Gap 1 (L) 4 - 13 mmol/L    Glucose 80 65 - 100 mg/dL    BUN 19 8 - 23 MG/DL    Creatinine 0.80 0.8 - 1.5 MG/DL    GFR African American >60 >60 ml/min/1.73m2    GFR Non- >60 >60 ml/min/1.73m2    Calcium 9.4 8.3 - 10.4 MG/DL    Total Bilirubin 0.5 0.2 - 1.1 MG/DL    ALT 33 12 - 65 U/L    AST 24 15 - 37 U/L    Alk Phosphatase 66 50 - 136 U/L    Total Protein 8.0 6.3 - 8.2 g/dL    Albumin 4.2 3.2 - 4.6 g/dL    Globulin 3.8 (H) 2.3 - 3.5 g/dL    Albumin/Globulin Ratio 1.1 (L) 1.2 - 3.5     EKG 12 Lead   Result Value Ref Range    Ventricular Rate 69 BPM    Atrial Rate 69 BPM    P-R Interval 143 ms    QRS Duration 99 ms    Q-T Interval 378 ms    QTc Calculation (Bazett) 405 ms    P Axis 51 degrees    R Axis 63 degrees    T Axis 17 degrees    Diagnosis Sinus rhythm         XR CHEST PORTABLE   Final Result   No acute abnormality. Voice dictation software was used during the making of this note. This software is not perfect and grammatical and other typographical errors may be present. This note has not been completely proofread for errors.      Charlie Roland MD  09/30/22 6365       Davon Caicedo MD  09/30/22 5491

## 2022-09-30 NOTE — DISCHARGE INSTRUCTIONS
Schedule close follow-up primary care physician. Return to ED if symptoms worsen or progress in any way.

## 2023-03-11 NOTE — ED TRIAGE NOTES
Pt states having back pain that is chronic for the past 7 years. Pt states he has been treating his back pain with street drugs. Pt has been using Lortab.
,

## 2024-05-24 ENCOUNTER — TRANSCRIBE ORDERS (OUTPATIENT)
Dept: SCHEDULING | Age: 64
End: 2024-05-24

## 2024-05-24 DIAGNOSIS — M54.16 LUMBAR RADICULOPATHY: Primary | ICD-10-CM

## 2024-10-29 ENCOUNTER — HOSPITAL ENCOUNTER (EMERGENCY)
Age: 64
Discharge: HOME | End: 2024-10-29
Attending: EMERGENCY MEDICINE
Payer: MEDICARE

## 2024-10-29 ENCOUNTER — APPOINTMENT (OUTPATIENT)
Dept: GENERAL RADIOLOGY | Age: 64
End: 2024-10-29
Payer: MEDICARE

## 2024-10-29 VITALS
DIASTOLIC BLOOD PRESSURE: 83 MMHG | TEMPERATURE: 97.6 F | HEART RATE: 93 BPM | RESPIRATION RATE: 16 BRPM | OXYGEN SATURATION: 99 % | SYSTOLIC BLOOD PRESSURE: 114 MMHG

## 2024-10-29 DIAGNOSIS — M54.42 CHRONIC LEFT-SIDED LOW BACK PAIN WITH LEFT-SIDED SCIATICA: Primary | ICD-10-CM

## 2024-10-29 DIAGNOSIS — G89.29 CHRONIC LEFT-SIDED LOW BACK PAIN WITH LEFT-SIDED SCIATICA: Primary | ICD-10-CM

## 2024-10-29 PROCEDURE — 72100 X-RAY EXAM L-S SPINE 2/3 VWS: CPT

## 2024-10-29 PROCEDURE — 99284 EMERGENCY DEPT VISIT MOD MDM: CPT

## 2024-10-29 PROCEDURE — 6370000000 HC RX 637 (ALT 250 FOR IP): Performed by: EMERGENCY MEDICINE

## 2024-10-29 PROCEDURE — 96372 THER/PROPH/DIAG INJ SC/IM: CPT

## 2024-10-29 PROCEDURE — 6360000002 HC RX W HCPCS: Performed by: EMERGENCY MEDICINE

## 2024-10-29 RX ORDER — LIDOCAINE 4 G/G
1 PATCH TOPICAL DAILY
Qty: 30 PATCH | Refills: 0 | Status: SHIPPED | OUTPATIENT
Start: 2024-10-29 | End: 2024-11-28

## 2024-10-29 RX ORDER — KETOROLAC TROMETHAMINE 30 MG/ML
30 INJECTION, SOLUTION INTRAMUSCULAR; INTRAVENOUS
Status: COMPLETED | OUTPATIENT
Start: 2024-10-29 | End: 2024-10-29

## 2024-10-29 RX ORDER — LIDOCAINE 4 G/G
1 PATCH TOPICAL
Status: DISCONTINUED | OUTPATIENT
Start: 2024-10-29 | End: 2024-10-29 | Stop reason: HOSPADM

## 2024-10-29 RX ADMIN — KETOROLAC TROMETHAMINE 30 MG: 30 INJECTION, SOLUTION INTRAMUSCULAR at 11:59

## 2024-10-29 ASSESSMENT — ENCOUNTER SYMPTOMS
VOMITING: 0
NAUSEA: 0
SHORTNESS OF BREATH: 0
ABDOMINAL PAIN: 0
COUGH: 0
BACK PAIN: 1

## 2024-10-29 ASSESSMENT — PAIN - FUNCTIONAL ASSESSMENT: PAIN_FUNCTIONAL_ASSESSMENT: 0-10

## 2024-10-29 ASSESSMENT — PAIN SCALES - GENERAL: PAINLEVEL_OUTOF10: 9

## 2024-10-29 ASSESSMENT — PAIN DESCRIPTION - LOCATION: LOCATION: BACK

## 2024-10-29 NOTE — ED PROVIDER NOTES
Emergency Department Provider Note       PCP: Not, On File (Inactive)   Age: 64 y.o.   Sex: male     DISPOSITION Decision To Discharge 10/29/2024 12:09:17 PM    ICD-10-CM    1. Chronic left-sided low back pain with left-sided sciatica  M54.42     G89.29           Medical Decision Making     64-year-old male with history of hypertension, opioid abuse, chronic lower back pain followed by pain management who presents with complaint of worsening chronic lower back pain that is progressed over the past 1 to 2 weeks.   Denies any numbness, tingling, weakness, bowel or bladder incontinence.  No focal deficits.  Ambulatory without assistance.  Normal DTRs.  No saddle anesthesia  Vital signs stable.  Afebrile.  X-ray L-spine with no acute radiographic abnormality.  Chronic degenerative changes noted.  Patient given lidocaine patch, Toradol 30 mg IM.  Will reassess pain control.  Given patient already followed by pain management will not prescribe narcotics at this time.  Patient recently filled prescription for morphine 15 mg as well as Norco 10 mg on 10/8/2024.  Patient given strict return precautions    ED Course as of 10/29/24 1210   Tue Oct 29, 2024   1155 X-ray L spine FINDINGS:  Alignment is within normal limits. Vertebral body heights are preserved.  Moderate to prominent multilevel osteophytosis. No radiographic evidence of  fracture. Moderate to prominent multilevel disc base loss. Bone mineralization  and soft tissues are within normal limits.        IMPRESSION:  No acute radiographic abnormality.     Degenerative changes.      [DF]      ED Course User Index  [DF] Davon Anderson Jr., MD     1 chronic illness with exacerbation.  Prescription drug management performed.  Chronic medical problems impacting care include chronic pain, chronic lower back pain.  Shared medical decision making was utilized in creating the patients health plan today.  I independently ordered and reviewed each unique test.    I

## 2024-10-29 NOTE — ED NOTES
Called pt 3s no answer.     Trinidad Herr  10/29/24 1108    
Patient can't be located for discharge     Lizabeth Huerta, RN  10/29/24 6374    
Patient left before last set of vital and before DC paperwork and scripts could be given.      Lizabeth Huerta, RN  10/29/24 9657    
110

## 2024-10-29 NOTE — ED TRIAGE NOTES
Pt reports disc issues in L1-L5.  Pt reports being treated by [facility].  Pt reports seeing pain management and is prescribed 3 norco - 10  a day and 1 morphine sulfate 15 mg nightly and it is not helping as of 2 weeks ago.  Pt denies being here for pain meds but wants to know why it is getting worse.

## 2024-10-29 NOTE — DISCHARGE INSTRUCTIONS
Continue taking home pain medication.  Schedule close follow-up with your pain management specialist, Ortho, PCP.  Return to ED if symptoms worsen or progress in any way.      We would love to help you get a primary care doctor for follow-up after your emergency department visit.  Please call [phone] between 7AM - 6PM Monday to Friday. A care navigator will be able to assist you with setting up a doctor close to your home.

## 2024-10-30 ENCOUNTER — HOSPITAL ENCOUNTER (EMERGENCY)
Age: 64
Discharge: HOME | End: 2024-10-30
Payer: MEDICARE

## 2024-10-30 VITALS
SYSTOLIC BLOOD PRESSURE: 143 MMHG | HEART RATE: 80 BPM | DIASTOLIC BLOOD PRESSURE: 96 MMHG | RESPIRATION RATE: 17 BRPM | OXYGEN SATURATION: 100 % | TEMPERATURE: 97.5 F

## 2024-10-30 DIAGNOSIS — M54.32 SCIATICA OF LEFT SIDE: Primary | ICD-10-CM

## 2024-10-30 PROCEDURE — 99283 EMERGENCY DEPT VISIT LOW MDM: CPT

## 2024-10-30 RX ORDER — CYCLOBENZAPRINE HCL 10 MG
10 TABLET ORAL 3 TIMES DAILY PRN
Qty: 21 TABLET | Refills: 0 | Status: SHIPPED | OUTPATIENT
Start: 2024-10-30 | End: 2024-11-09

## 2024-10-30 ASSESSMENT — LIFESTYLE VARIABLES
HOW OFTEN DO YOU HAVE A DRINK CONTAINING ALCOHOL: NEVER
HOW MANY STANDARD DRINKS CONTAINING ALCOHOL DO YOU HAVE ON A TYPICAL DAY: PATIENT DOES NOT DRINK

## 2024-10-30 ASSESSMENT — PAIN - FUNCTIONAL ASSESSMENT: PAIN_FUNCTIONAL_ASSESSMENT: 0-10

## 2024-10-30 ASSESSMENT — PAIN SCALES - GENERAL: PAINLEVEL_OUTOF10: 8

## 2024-10-30 NOTE — ED TRIAGE NOTES
Pt ambulatory to triage c/o lower back pain for \"several years\".Pt reports seeing [facility]. Pt denies recent mechanical issues.

## 2024-10-30 NOTE — DISCHARGE INSTRUCTIONS
As we discussed, your symptoms are most consistent with neuropathic or sciatic pain.  Continue medications as we discussed.  Begin taking muscle relaxer as prescribed.  Keep your appointment on the fifth.  Continue using heat application to help alleviate pain.  Over-the-counter lidocaine patches can also be helpful.  Gentle stretching of your lower back may also help with this pain.  Return to the emergency department for any new, worsening, or concerning symptoms.

## 2024-10-30 NOTE — ED PROVIDER NOTES
Emergency Department Provider Note       PCP: Not, On File (Inactive)   Age: 64 y.o.   Sex: male     DISPOSITION Decision To Discharge 10/30/2024 08:49:23 AM    ICD-10-CM    1. Sciatica of left side  M54.32           Medical Decision Making     64-year-old male presents emergency department today with complaint of left lower back pain that radiates into the left leg.  Patient has no red flag symptoms.  Symptoms most consistent with sciatic or neuropathic pain.  We discussed nonpharmacological methods of treatment/pain relief.  He is already on Norco and MS IR as prescribed by his orthopedic provider.  Will try adding a muscle relaxer.  Risk associated with this medication discussed with the patient.  He had an x-ray yesterday that was unremarkable.  No indication for any imaging today.  Appears appropriate for discharge at this time.  He has an upcoming appointment with his doctor.  Encouraged him to keep this appointment.  ER return precautions discussed.     1 chronic illness with exacerbation.  Prescription drug management performed.  Shared medical decision making was utilized in creating the patients health plan today.  I independently ordered and reviewed each unique test.    I reviewed external records: ED visit note from an outside group.  I reviewed external records: provider visit note from PCP.  I reviewed external records: PDMP                      History     64-year-old male presents to the emergency department today with complaint of lower back pain. He states he sees [facility] and has an appointment with them on November 5.  He states that he is on Norco 3 times a day and morphine at bedtime.  This is not helping with his pain.  He was seen yesterday and given Toradol but states this did not help either.  The pain is in the left lower back and radiates down the back of the left leg.  He states the pain in his leg is burning in nature.  He denies any fever, chills, saddle paresthesia, urinary

## 2024-11-20 ENCOUNTER — HOSPITAL ENCOUNTER (EMERGENCY)
Age: 64
Discharge: LEFT BEFORE DISCHARGE | End: 2024-11-20
Payer: MEDICARE

## 2024-11-20 VITALS
RESPIRATION RATE: 18 BRPM | SYSTOLIC BLOOD PRESSURE: 175 MMHG | DIASTOLIC BLOOD PRESSURE: 108 MMHG | HEIGHT: 67 IN | OXYGEN SATURATION: 100 % | BODY MASS INDEX: 21.66 KG/M2 | WEIGHT: 138 LBS | HEART RATE: 77 BPM | TEMPERATURE: 98.1 F

## 2024-11-20 DIAGNOSIS — G89.29 CHRONIC LEFT-SIDED LOW BACK PAIN WITH LEFT-SIDED SCIATICA: Primary | ICD-10-CM

## 2024-11-20 DIAGNOSIS — M54.42 CHRONIC LEFT-SIDED LOW BACK PAIN WITH LEFT-SIDED SCIATICA: Primary | ICD-10-CM

## 2024-11-20 PROCEDURE — 99283 EMERGENCY DEPT VISIT LOW MDM: CPT

## 2024-11-20 RX ORDER — DEXAMETHASONE SODIUM PHOSPHATE 10 MG/ML
6 INJECTION INTRAMUSCULAR; INTRAVENOUS
Status: DISCONTINUED | OUTPATIENT
Start: 2024-11-20 | End: 2024-11-20 | Stop reason: HOSPADM

## 2024-11-20 RX ORDER — METHYLPREDNISOLONE 4 MG/1
TABLET ORAL
Qty: 1 KIT | Refills: 0 | Status: SHIPPED | OUTPATIENT
Start: 2024-11-20

## 2024-11-20 RX ORDER — KETOROLAC TROMETHAMINE 30 MG/ML
30 INJECTION, SOLUTION INTRAMUSCULAR; INTRAVENOUS ONCE
Status: DISCONTINUED | OUTPATIENT
Start: 2024-11-20 | End: 2024-11-20 | Stop reason: HOSPADM

## 2024-11-20 ASSESSMENT — PAIN DESCRIPTION - DESCRIPTORS: DESCRIPTORS: BURNING

## 2024-11-20 ASSESSMENT — PAIN SCALES - GENERAL: PAINLEVEL_OUTOF10: 10

## 2024-11-20 ASSESSMENT — PAIN DESCRIPTION - LOCATION: LOCATION: BACK

## 2024-11-20 ASSESSMENT — PAIN DESCRIPTION - ORIENTATION: ORIENTATION: LOWER;LEFT;RIGHT

## 2024-11-20 ASSESSMENT — ENCOUNTER SYMPTOMS
BACK PAIN: 1
VOMITING: 0
NAUSEA: 0
ABDOMINAL PAIN: 0
DIARRHEA: 0

## 2024-11-20 ASSESSMENT — PAIN - FUNCTIONAL ASSESSMENT: PAIN_FUNCTIONAL_ASSESSMENT: 0-10

## 2024-11-20 NOTE — ED TRIAGE NOTES
My back is hurting bad. I have bad disc in it. I can't stand the pain, I can't hardly sit up or do nothing. I haven't slept in a couple days. Started 2-3 weeks.

## 2024-11-20 NOTE — ED PROVIDER NOTES
Emergency Department Provider Note       PCP: Not, On File (Inactive)   Age: 64 y.o.   Sex: male     DISPOSITION Discharge - Pending Orders Complete 11/20/2024 05:07:51 AM    ICD-10-CM    1. Chronic left-sided low back pain with left-sided sciatica  M54.42 [facility] (Spine Surgery)    G89.29           Medical Decision Making     64-year-old male presents with worsening of chronic lower back pain.  He has no red flag symptoms.  Recently had imaging which shows degenerative changes, he has had no fall or injury.  He is under pain management and has access to narcotics.  He had 30 days of morphine and Norco filled on 11/5.  Started on course of steroids and discharged with referral to orthospine per patient request.     1 or more acute illnesses that pose a threat to life or bodily function.   Prescription drug management performed.  Patient was discharged risks and benefits of hospitalization were considered.  Shared medical decision making was utilized in creating the patients health plan today.  I independently ordered and reviewed each unique test.    I reviewed external records: ED visit note from an outside group.                     History     64 year old male presents with worsening of chronic back pain with radiation of pain to LLE. This has been ongoing and worsening for months. Denies injury. Denies weakness, numbness, change in bowel or bladder habits. He chronically uses narcotics related to this back pain. He is currently on norco and morphine.     The history is provided by the patient.       ROS     Review of Systems   Constitutional:  Negative for chills, fatigue and fever.   Gastrointestinal:  Negative for abdominal pain, diarrhea, nausea and vomiting.   Musculoskeletal:  Positive for back pain. Negative for gait problem.   All other systems reviewed and are negative.       Physical Exam     Vitals signs and nursing note reviewed:  Vitals:    11/20/24 0452   BP: (!)

## 2024-11-20 NOTE — ED NOTES
ED nurse called pt for medication administration and he did not answer. Will try again     Speaks, FELIPA Jackson  11/20/24 [id number]

## 2024-11-20 NOTE — DISCHARGE INSTRUCTIONS
Start steroid once daily for next 7 days. Continue using morphine and norco for pain. Follow up with spine doctors as listed on this paper.

## 2024-11-20 NOTE — ED NOTES
Rn called for patient again, still no answer. Discussed with charge and will annotate that pt celestina     Speaks, FELIPA Jackson  11/20/24 [id number]

## 2024-12-23 ENCOUNTER — APPOINTMENT (OUTPATIENT)
Dept: GENERAL RADIOLOGY | Age: 64
End: 2024-12-23
Payer: MEDICARE

## 2024-12-23 ENCOUNTER — APPOINTMENT (OUTPATIENT)
Dept: CT IMAGING | Age: 64
End: 2024-12-23
Payer: MEDICARE

## 2024-12-23 ENCOUNTER — HOSPITAL ENCOUNTER (EMERGENCY)
Age: 64
Discharge: HOME | End: 2024-12-23
Payer: MEDICARE

## 2024-12-23 VITALS
TEMPERATURE: 97.9 F | BODY MASS INDEX: 21.19 KG/M2 | HEART RATE: 99 BPM | DIASTOLIC BLOOD PRESSURE: 110 MMHG | HEIGHT: 67 IN | RESPIRATION RATE: 17 BRPM | SYSTOLIC BLOOD PRESSURE: 166 MMHG | OXYGEN SATURATION: 100 % | WEIGHT: 135 LBS

## 2024-12-23 DIAGNOSIS — R07.89 ATYPICAL CHEST PAIN: ICD-10-CM

## 2024-12-23 DIAGNOSIS — R03.0 ELEVATED BLOOD PRESSURE READING: ICD-10-CM

## 2024-12-23 DIAGNOSIS — R07.9 CHEST PAIN, UNSPECIFIED TYPE: Primary | ICD-10-CM

## 2024-12-23 LAB
ALBUMIN SERPL-MCNC: 3.4 G/DL (ref 3.2–4.6)
ALBUMIN/GLOB SERPL: 0.6 (ref 1–1.9)
ALP SERPL-CCNC: 90 U/L (ref 40–129)
ALT SERPL-CCNC: 61 U/L (ref 8–55)
ANION GAP SERPL CALC-SCNC: 13 MMOL/L (ref 7–16)
AST SERPL-CCNC: 62 U/L (ref 15–37)
BASOPHILS # BLD: 0 K/UL (ref 0–0.2)
BASOPHILS NFR BLD: 0 % (ref 0–2)
BILIRUB SERPL-MCNC: <0.2 MG/DL (ref 0–1.2)
BUN SERPL-MCNC: 25 MG/DL (ref 8–23)
CALCIUM SERPL-MCNC: 9.8 MG/DL (ref 8.8–10.2)
CHLORIDE SERPL-SCNC: 102 MMOL/L (ref 98–107)
CO2 SERPL-SCNC: 24 MMOL/L (ref 20–29)
CREAT SERPL-MCNC: 0.85 MG/DL (ref 0.8–1.3)
D DIMER PPP FEU-MCNC: 0.59 UG/ML(FEU)
DIFFERENTIAL METHOD BLD: ABNORMAL
EKG ATRIAL RATE: 104 BPM
EKG DIAGNOSIS: NORMAL
EKG P AXIS: 54 DEGREES
EKG P-R INTERVAL: 114 MS
EKG Q-T INTERVAL: 344 MS
EKG QRS DURATION: 76 MS
EKG QTC CALCULATION (BAZETT): 452 MS
EKG R AXIS: 62 DEGREES
EKG T AXIS: 19 DEGREES
EKG VENTRICULAR RATE: 104 BPM
EOSINOPHIL # BLD: 0 K/UL (ref 0–0.8)
EOSINOPHIL NFR BLD: 0 % (ref 0.5–7.8)
ERYTHROCYTE [DISTWIDTH] IN BLOOD BY AUTOMATED COUNT: 14.2 % (ref 11.9–14.6)
GLOBULIN SER CALC-MCNC: 5.7 G/DL (ref 2.3–3.5)
GLUCOSE SERPL-MCNC: 117 MG/DL (ref 70–99)
HCT VFR BLD AUTO: 38.9 % (ref 41.1–50.3)
HGB BLD-MCNC: 12.7 G/DL (ref 13.6–17.2)
IMM GRANULOCYTES # BLD AUTO: 0.1 K/UL (ref 0–0.5)
IMM GRANULOCYTES NFR BLD AUTO: 1 % (ref 0–5)
LYMPHOCYTES # BLD: 2.3 K/UL (ref 0.5–4.6)
LYMPHOCYTES NFR BLD: 16 % (ref 13–44)
MAGNESIUM SERPL-MCNC: 2.2 MG/DL (ref 1.8–2.4)
MCH RBC QN AUTO: 29 PG (ref 26.1–32.9)
MCHC RBC AUTO-ENTMCNC: 32.6 G/DL (ref 31.4–35)
MCV RBC AUTO: 88.8 FL (ref 82–102)
MONOCYTES # BLD: 1.1 K/UL (ref 0.1–1.3)
MONOCYTES NFR BLD: 8 % (ref 4–12)
NEUTS SEG # BLD: 10.8 K/UL (ref 1.7–8.2)
NEUTS SEG NFR BLD: 75 % (ref 43–78)
NRBC # BLD: 0 K/UL (ref 0–0.2)
PLATELET # BLD AUTO: 552 K/UL (ref 150–450)
PMV BLD AUTO: 8 FL (ref 9.4–12.3)
POTASSIUM SERPL-SCNC: 4.9 MMOL/L (ref 3.5–5.1)
PROT SERPL-MCNC: 9.1 G/DL (ref 6.3–8.2)
RBC # BLD AUTO: 4.38 M/UL (ref 4.23–5.6)
SODIUM SERPL-SCNC: 138 MMOL/L (ref 136–145)
TROPONIN T SERPL HS-MCNC: 24 NG/L (ref 0–22)
TROPONIN T SERPL HS-MCNC: 25 NG/L (ref 0–22)
WBC # BLD AUTO: 14.4 K/UL (ref 4.3–11.1)

## 2024-12-23 PROCEDURE — 80053 COMPREHEN METABOLIC PANEL: CPT

## 2024-12-23 PROCEDURE — 99285 EMERGENCY DEPT VISIT HI MDM: CPT

## 2024-12-23 PROCEDURE — 85379 FIBRIN DEGRADATION QUANT: CPT

## 2024-12-23 PROCEDURE — 71046 X-RAY EXAM CHEST 2 VIEWS: CPT

## 2024-12-23 PROCEDURE — 85025 COMPLETE CBC W/AUTO DIFF WBC: CPT

## 2024-12-23 PROCEDURE — 93010 ELECTROCARDIOGRAM REPORT: CPT | Performed by: INTERNAL MEDICINE

## 2024-12-23 PROCEDURE — 93005 ELECTROCARDIOGRAM TRACING: CPT | Performed by: NURSE PRACTITIONER

## 2024-12-23 PROCEDURE — 83735 ASSAY OF MAGNESIUM: CPT

## 2024-12-23 PROCEDURE — 71260 CT THORAX DX C+: CPT

## 2024-12-23 PROCEDURE — 84484 ASSAY OF TROPONIN QUANT: CPT

## 2024-12-23 PROCEDURE — 6360000004 HC RX CONTRAST MEDICATION: Performed by: NURSE PRACTITIONER

## 2024-12-23 PROCEDURE — 6370000000 HC RX 637 (ALT 250 FOR IP): Performed by: NURSE PRACTITIONER

## 2024-12-23 RX ORDER — ASPIRIN 81 MG/1
324 TABLET, CHEWABLE ORAL
Status: COMPLETED | OUTPATIENT
Start: 2024-12-23 | End: 2024-12-23

## 2024-12-23 RX ORDER — IOPAMIDOL 755 MG/ML
75 INJECTION, SOLUTION INTRAVASCULAR
Status: COMPLETED | OUTPATIENT
Start: 2024-12-23 | End: 2024-12-23

## 2024-12-23 RX ADMIN — ASPIRIN 324 MG: 81 TABLET, CHEWABLE ORAL at 12:54

## 2024-12-23 RX ADMIN — IOPAMIDOL 75 ML: 755 INJECTION, SOLUTION INTRAVENOUS at 14:49

## 2024-12-23 ASSESSMENT — HEART SCORE: ECG: NORMAL

## 2024-12-23 ASSESSMENT — PAIN DESCRIPTION - LOCATION: LOCATION: CHEST

## 2024-12-23 ASSESSMENT — PAIN SCALES - GENERAL: PAINLEVEL_OUTOF10: 5

## 2024-12-23 NOTE — ED PROVIDER NOTES
2.0 %    Immature Granulocytes % 1 0.0 - 5.0 %    Neutrophils Absolute 10.8 (H) 1.7 - 8.2 K/UL    Lymphocytes Absolute 2.3 0.5 - 4.6 K/UL    Monocytes Absolute 1.1 0.1 - 1.3 K/UL    Eosinophils Absolute 0.0 0.0 - 0.8 K/UL    Basophils Absolute 0.0 0.0 - 0.2 K/UL    Immature Granulocytes Absolute 0.1 0.0 - 0.5 K/UL   Comprehensive Metabolic Panel   Result Value Ref Range    Sodium 138 136 - 145 mmol/L    Potassium 4.9 3.5 - 5.1 mmol/L    Chloride 102 98 - 107 mmol/L    CO2 24 20 - 29 mmol/L    Anion Gap 13 7 - 16 mmol/L    Glucose 117 (H) 70 - 99 mg/dL    BUN 25 (H) 8 - 23 MG/DL    Creatinine 0.85 0.80 - 1.30 MG/DL    Est, Glom Filt Rate >90 >60 ml/min/1.73m2    Calcium 9.8 8.8 - 10.2 MG/DL    Total Bilirubin <0.2 0.0 - 1.2 MG/DL    ALT 61 (H) 8 - 55 U/L    AST 62 (H) 15 - 37 U/L    Alk Phosphatase 90 40 - 129 U/L    Total Protein 9.1 (H) 6.3 - 8.2 g/dL    Albumin 3.4 3.2 - 4.6 g/dL    Globulin 5.7 (H) 2.3 - 3.5 g/dL    Albumin/Globulin Ratio 0.6 (L) 1.0 - 1.9     Troponin   Result Value Ref Range    Troponin T 25.0 (H) 0 - 22 ng/L   Troponin   Result Value Ref Range    Troponin T 24.0 (H) 0 - 22 ng/L   EKG 12 Lead   Result Value Ref Range    Ventricular Rate 104 BPM    Atrial Rate 104 BPM    P-R Interval 114 ms    QRS Duration 76 ms    Q-T Interval 344 ms    QTc Calculation (Bazett) 452 ms    P Axis 54 degrees    R Axis 62 degrees    T Axis 19 degrees    Diagnosis       Sinus tachycardia  Nonspecific T wave abnormality  Abnormal ECG  When compared with ECG of 30-SEP-2022 07:51,  No significant change was found    Confirmed by MD JEROME DANIEL ([id number]) on 12/23/2024 3:28:44 PM           CT CHEST PULMONARY EMBOLISM W CONTRAST   Final Result   No acute finding.      Mild emphysematous changes      Significant coronary artery calcification. Consider appropriate risk   stratification if not already performed.         Electronically signed by New VEGAS CHEST (2 VW)   Final Result   No acute findings in the

## 2024-12-23 NOTE — DISCHARGE INSTRUCTIONS
Rest is much as possible  Make sure to follow-up with cardiology.  We did send a referral.  And they will call you and reach out to set you up for an appointment  Your findings here in the emergency department showed that there was no change in your EKG however you do have coronary artery calcifications which can cause problems with the heart.  And you will need to have a stress test and further restratification as an outpatient with cardiology so make sure to follow-up  If you have any return of your chest pain, or any worsening symptoms, shortness of breath, dyspnea on exertion.  Return to the ER right away

## 2024-12-23 NOTE — ED NOTES
Pt refusing to stay in ITR area until d/c. Pt found wandering in lobby. Pt spoke with provider and educated patient. Pt waiting for further instruction in lobby.     Nuha Head, RN  12/23/24 [id number]

## 2024-12-24 ENCOUNTER — TELEPHONE (OUTPATIENT)
Age: 64
End: 2024-12-24

## 2024-12-24 NOTE — TELEPHONE ENCOUNTER
----- Message from SOHAIL Aldridge CNP sent at 12/23/2024  4:43 PM EST -----  Per Dr LEON this patient needs to be seen in the office soon.  Being discharged from the ED today.  Please get him in 1-2 weeks for new consult.      SOHAIL Aldridge CNP  12/23/24  4:43 PM

## 2024-12-24 NOTE — TELEPHONE ENCOUNTER
Phone # on pt's chart is not in service. I called the alternate phone # and it is his son who does not have contact with the pt. I scheduled the consult appt and mailed a letter on 12/24 notifying pt of the appt date/time.

## 2025-05-20 ENCOUNTER — TRANSCRIBE ORDERS (OUTPATIENT)
Dept: SCHEDULING | Age: 65
End: 2025-05-20

## 2025-05-20 DIAGNOSIS — M54.16 LUMBAR RADICULAR PAIN: Primary | ICD-10-CM
